# Patient Record
Sex: FEMALE | Race: OTHER | Employment: FULL TIME | URBAN - METROPOLITAN AREA
[De-identification: names, ages, dates, MRNs, and addresses within clinical notes are randomized per-mention and may not be internally consistent; named-entity substitution may affect disease eponyms.]

---

## 2021-05-26 ENCOUNTER — OFFICE VISIT (OUTPATIENT)
Dept: FAMILY MEDICINE CLINIC | Facility: CLINIC | Age: 21
End: 2021-05-26

## 2021-05-26 VITALS
HEIGHT: 62 IN | TEMPERATURE: 97 F | BODY MASS INDEX: 17.7 KG/M2 | HEART RATE: 92 BPM | WEIGHT: 96.19 LBS | DIASTOLIC BLOOD PRESSURE: 76 MMHG | SYSTOLIC BLOOD PRESSURE: 114 MMHG

## 2021-05-26 DIAGNOSIS — Z00.00 ROUTINE MEDICAL EXAM: Primary | ICD-10-CM

## 2021-05-26 DIAGNOSIS — R63.6 LOW WEIGHT: ICD-10-CM

## 2021-05-26 DIAGNOSIS — N92.6 IRREGULAR MENSES: ICD-10-CM

## 2021-05-26 PROCEDURE — 99385 PREV VISIT NEW AGE 18-39: CPT | Performed by: FAMILY MEDICINE

## 2021-05-26 PROCEDURE — 3078F DIAST BP <80 MM HG: CPT | Performed by: FAMILY MEDICINE

## 2021-05-26 PROCEDURE — 3074F SYST BP LT 130 MM HG: CPT | Performed by: FAMILY MEDICINE

## 2021-05-26 PROCEDURE — 3008F BODY MASS INDEX DOCD: CPT | Performed by: FAMILY MEDICINE

## 2021-05-26 RX ORDER — NORETHINDRONE ACETATE AND ETHINYL ESTRADIOL 1MG-20(21)
1 KIT ORAL DAILY
Qty: 84 TABLET | Refills: 1 | Status: SHIPPED | OUTPATIENT
Start: 2021-05-26 | End: 2021-11-23

## 2021-05-26 NOTE — PROGRESS NOTES
HPI:   Janae Grissom is a 23year old female who presents for a complete physical exam.    Reports her menses is irregular - will skip it and then when comes it is heavy and some months few days. Reports irregular for a while.  Was on OCPs about 5 years ago bruits  CHEST: no chest tenderness  LUNGS: clear to auscultation  CARDIO: RRR without murmur  GI: good BS's,no masses, HSM or tenderness  MUSCULOSKELETAL: back is not tender,FROM of the back  EXTREMITIES: no cyanosis, clubbing or edema  NEURO: Oriented jessu

## 2021-05-29 ENCOUNTER — LAB ENCOUNTER (OUTPATIENT)
Dept: LAB | Age: 21
End: 2021-05-29
Attending: FAMILY MEDICINE
Payer: COMMERCIAL

## 2021-05-29 DIAGNOSIS — Z00.00 ROUTINE MEDICAL EXAM: ICD-10-CM

## 2021-05-29 DIAGNOSIS — N92.6 IRREGULAR MENSES: ICD-10-CM

## 2021-05-29 PROCEDURE — 80053 COMPREHEN METABOLIC PANEL: CPT

## 2021-05-29 PROCEDURE — 84703 CHORIONIC GONADOTROPIN ASSAY: CPT

## 2021-05-29 PROCEDURE — 84466 ASSAY OF TRANSFERRIN: CPT

## 2021-05-29 PROCEDURE — 84443 ASSAY THYROID STIM HORMONE: CPT

## 2021-05-29 PROCEDURE — 83540 ASSAY OF IRON: CPT

## 2021-05-29 PROCEDURE — 36415 COLL VENOUS BLD VENIPUNCTURE: CPT

## 2021-05-29 PROCEDURE — 84445 ASSAY OF TSI GLOBULIN: CPT

## 2021-05-29 PROCEDURE — 86800 THYROGLOBULIN ANTIBODY: CPT

## 2021-05-29 PROCEDURE — 80061 LIPID PANEL: CPT

## 2021-05-29 PROCEDURE — 84439 ASSAY OF FREE THYROXINE: CPT

## 2021-05-29 PROCEDURE — 85025 COMPLETE CBC W/AUTO DIFF WBC: CPT

## 2021-05-29 PROCEDURE — 82306 VITAMIN D 25 HYDROXY: CPT

## 2021-05-29 PROCEDURE — 82607 VITAMIN B-12: CPT

## 2021-06-05 NOTE — PROGRESS NOTES
Bee Gabriel - The pregnancy test was negative so ok to start the birth control. Your thyroid levels were all normal. Your vitamin D levels were extremely low so I am starting you on weekly high dose vitamin D 50,000.  Also your iron levels were low so will sta

## 2021-09-21 ENCOUNTER — OFFICE VISIT (OUTPATIENT)
Dept: FAMILY MEDICINE CLINIC | Facility: CLINIC | Age: 21
End: 2021-09-21

## 2021-09-21 VITALS
TEMPERATURE: 97 F | WEIGHT: 105.63 LBS | SYSTOLIC BLOOD PRESSURE: 122 MMHG | HEART RATE: 109 BPM | HEIGHT: 62 IN | BODY MASS INDEX: 19.44 KG/M2 | DIASTOLIC BLOOD PRESSURE: 77 MMHG

## 2021-09-21 DIAGNOSIS — G44.209 TENSION HEADACHE: Primary | ICD-10-CM

## 2021-09-21 PROCEDURE — 3074F SYST BP LT 130 MM HG: CPT | Performed by: FAMILY MEDICINE

## 2021-09-21 PROCEDURE — 99213 OFFICE O/P EST LOW 20 MIN: CPT | Performed by: FAMILY MEDICINE

## 2021-09-21 PROCEDURE — 3078F DIAST BP <80 MM HG: CPT | Performed by: FAMILY MEDICINE

## 2021-09-21 PROCEDURE — 3008F BODY MASS INDEX DOCD: CPT | Performed by: FAMILY MEDICINE

## 2021-09-21 RX ORDER — NAPROXEN 500 MG/1
500 TABLET ORAL 2 TIMES DAILY WITH MEALS
Qty: 20 TABLET | Refills: 0 | Status: SHIPPED | OUTPATIENT
Start: 2021-09-21 | End: 2021-12-17

## 2021-09-21 RX ORDER — CYCLOBENZAPRINE HCL 5 MG
5 TABLET ORAL NIGHTLY
Qty: 20 TABLET | Refills: 0 | Status: SHIPPED | OUTPATIENT
Start: 2021-09-21 | End: 2021-12-17

## 2021-09-21 RX ORDER — ERGOCALCIFEROL 1.25 MG/1
50000 CAPSULE ORAL WEEKLY
COMMUNITY
Start: 2021-08-15

## 2021-09-21 NOTE — PROGRESS NOTES
Valarie Cohw is a 19year old female. Patient presents with:  Headache: x 1 week    HPI:   Reports over a week with bad headaches. Reports starts on temples and feels around to back of head. Reports a lot of stress and anxiety and making them worse.  No na clubbing or edema  NEURO: normal gait. Neg romberg. Normal strength. ASSESSMENT AND PLAN:   1. Tension headache  Trial of flexeril in evenings and naproxen BID. Cut back on caffeine.        The patient indicates understanding of these issues and agree

## 2021-11-24 RX ORDER — NORETHINDRONE ACETATE AND ETHINYL ESTRADIOL 1MG-20(21)
1 KIT ORAL DAILY
Qty: 28 TABLET | Refills: 0 | Status: SHIPPED | OUTPATIENT
Start: 2021-11-24 | End: 2021-12-17

## 2021-11-24 RX ORDER — NORETHINDRONE ACETATE AND ETHINYL ESTRADIOL 1MG-20(21)
1 KIT ORAL DAILY
Qty: 84 TABLET | Refills: 1 | OUTPATIENT
Start: 2021-11-24

## 2021-12-17 ENCOUNTER — OFFICE VISIT (OUTPATIENT)
Dept: FAMILY MEDICINE CLINIC | Facility: CLINIC | Age: 21
End: 2021-12-17

## 2021-12-17 VITALS
BODY MASS INDEX: 19.32 KG/M2 | WEIGHT: 105 LBS | DIASTOLIC BLOOD PRESSURE: 73 MMHG | HEIGHT: 62 IN | HEART RATE: 128 BPM | SYSTOLIC BLOOD PRESSURE: 115 MMHG

## 2021-12-17 DIAGNOSIS — Z00.00 WELL WOMAN EXAM (NO GYNECOLOGICAL EXAM): Primary | ICD-10-CM

## 2021-12-17 DIAGNOSIS — Z12.4 SCREENING FOR MALIGNANT NEOPLASM OF CERVIX: ICD-10-CM

## 2021-12-17 PROCEDURE — 3078F DIAST BP <80 MM HG: CPT | Performed by: NURSE PRACTITIONER

## 2021-12-17 PROCEDURE — 3008F BODY MASS INDEX DOCD: CPT | Performed by: NURSE PRACTITIONER

## 2021-12-17 PROCEDURE — 3074F SYST BP LT 130 MM HG: CPT | Performed by: NURSE PRACTITIONER

## 2021-12-17 RX ORDER — NORETHINDRONE ACETATE AND ETHINYL ESTRADIOL 1MG-20(21)
1 KIT ORAL DAILY
Qty: 84 TABLET | Refills: 3 | Status: SHIPPED | OUTPATIENT
Start: 2021-12-17

## 2021-12-17 NOTE — PROGRESS NOTES
HPI    Patient presents for pap only; has never had completed prior to today. Last physical 5/26/21. Currently on ocp's for birth control. Needs refill. G0. LMP 11/29/21. Review of Systems   Genitourinary: Negative.           12/17/21  0900 tablet by mouth daily. 84 tablet 3   • ergocalciferol 1.25 MG (82225 UT) Oral Cap Take 50,000 Units by mouth once a week. • amitriptyline 10 MG Oral Tab Take 1 tablet (10 mg total) by mouth nightly.  30 tablet 0       Allergies:  No Known Allergies    P

## 2021-12-17 NOTE — PATIENT INSTRUCTIONS
The Range of Pap Test Results  When your Pap test is sent to the lab, the lab studies your cell samples and reports any abnormal cell changes. Your healthcare provider can discuss these changes with you.  In some cases, an abnormal Pap test is due to an i destroy or remove problem cells. (Reported as low-grade RAVEN or TIFFANY 1.)  · Moderate to severe dysplasia. Cells show precancerous changes. Or noninvasive cancer (carcinoma in situ) may be present. Treatment to destroy or remove problem cells is likely.  (Repo

## 2022-01-14 ENCOUNTER — TELEMEDICINE (OUTPATIENT)
Dept: FAMILY MEDICINE CLINIC | Facility: CLINIC | Age: 22
End: 2022-01-14

## 2022-01-14 ENCOUNTER — NURSE TRIAGE (OUTPATIENT)
Dept: FAMILY MEDICINE CLINIC | Facility: CLINIC | Age: 22
End: 2022-01-14

## 2022-01-14 ENCOUNTER — PATIENT MESSAGE (OUTPATIENT)
Dept: FAMILY MEDICINE CLINIC | Facility: CLINIC | Age: 22
End: 2022-01-14

## 2022-01-14 DIAGNOSIS — J02.9 SORE THROAT: Primary | ICD-10-CM

## 2022-01-14 DIAGNOSIS — J06.9 VIRAL UPPER RESPIRATORY TRACT INFECTION: Primary | ICD-10-CM

## 2022-01-14 PROCEDURE — 99213 OFFICE O/P EST LOW 20 MIN: CPT | Performed by: NURSE PRACTITIONER

## 2022-01-14 NOTE — TELEPHONE ENCOUNTER
Kylie Brian RN 1/14/2022 11:04 AM CST        ----- Message -----  From: Marni Dates  Sent: 1/14/2022 10:50 AM CST  To: Em Rn Triage  Subject: PCR results     Hello, I attached my results from my PCR test and it came back negative is there any availab

## 2022-01-14 NOTE — TELEPHONE ENCOUNTER
Action Requested: Summary for Provider     []  Critical Lab, Recommendations Needed  [] Need Additional Advice  []   FYI    []   Need Orders  [] Need Medications Sent to Pharmacy  []  Other     SUMMARY:video appt made, s/s Wednesday sore throat, h/a, elaina

## 2022-01-14 NOTE — PATIENT INSTRUCTIONS
Viral Upper Respiratory Illness (Adult)    You have a viral upper respiratory illness (URI), which is another term for the common cold. This illness is contagious during the first few days. It is spread through the air by coughing and sneezing.  It may use decongestants if you have high blood pressure.)  Follow-up care  Follow up with your healthcare provider, or as advised.   When to seek medical advice  Call your healthcare provider right away if any of these occur:  · Cough with lots of colored sputum

## 2022-01-14 NOTE — ASSESSMENT & PLAN NOTE
pcr covid test results pending  Presume covid infection-discussed self isolation  Supportive care discussed to help alleviate symptoms  Please call if symptoms worsen or are not resolving.

## 2022-01-14 NOTE — PROGRESS NOTES
HPI  Video visit Pt presents for uri symptoms that started 2 days ago. Chills, sore throat, headache, upper back pain. Fever 102.8. Vomited x1 on Wed. Throat is burning. Has slight cough  Fever and chills have subsided.      Rapid test came back negative- Concerns:        Not on file    Social History Narrative      Not on file    Social Determinants of Health  Financial Resource Strain: Not on file  Food Insecurity: Not on file  Transportation Needs: Not on file  Physical Activity: Not on file  Stress: Not

## 2022-01-15 ENCOUNTER — LAB ENCOUNTER (OUTPATIENT)
Dept: LAB | Age: 22
End: 2022-01-15
Attending: NURSE PRACTITIONER
Payer: COMMERCIAL

## 2022-01-15 ENCOUNTER — IMMUNIZATION (OUTPATIENT)
Dept: LAB | Facility: HOSPITAL | Age: 22
End: 2022-01-15
Attending: EMERGENCY MEDICINE
Payer: COMMERCIAL

## 2022-01-15 DIAGNOSIS — Z23 NEED FOR VACCINATION: Primary | ICD-10-CM

## 2022-01-15 DIAGNOSIS — J02.9 SORE THROAT: ICD-10-CM

## 2022-01-15 PROCEDURE — 0054A SARSCOV2 VAC 30MCG/0.3ML IM: CPT | Performed by: PHYSICIAN ASSISTANT

## 2022-01-15 PROCEDURE — 0004A SARSCOV2 VAC 30MCG/0.3ML IM: CPT | Performed by: PHYSICIAN ASSISTANT

## 2022-01-15 PROCEDURE — 87081 CULTURE SCREEN ONLY: CPT

## 2022-02-25 ENCOUNTER — OFFICE VISIT (OUTPATIENT)
Dept: FAMILY MEDICINE CLINIC | Facility: CLINIC | Age: 22
End: 2022-02-25
Payer: COMMERCIAL

## 2022-02-25 ENCOUNTER — NURSE TRIAGE (OUTPATIENT)
Dept: FAMILY MEDICINE CLINIC | Facility: CLINIC | Age: 22
End: 2022-02-25

## 2022-02-25 VITALS
BODY MASS INDEX: 19.69 KG/M2 | HEART RATE: 103 BPM | WEIGHT: 107 LBS | SYSTOLIC BLOOD PRESSURE: 126 MMHG | HEIGHT: 62 IN | DIASTOLIC BLOOD PRESSURE: 81 MMHG

## 2022-02-25 DIAGNOSIS — J30.9 ALLERGIC CONJUNCTIVITIS OF BOTH EYES AND RHINITIS: Primary | ICD-10-CM

## 2022-02-25 DIAGNOSIS — H10.13 ALLERGIC CONJUNCTIVITIS OF BOTH EYES AND RHINITIS: Primary | ICD-10-CM

## 2022-02-25 PROCEDURE — 3074F SYST BP LT 130 MM HG: CPT | Performed by: NURSE PRACTITIONER

## 2022-02-25 PROCEDURE — 3008F BODY MASS INDEX DOCD: CPT | Performed by: NURSE PRACTITIONER

## 2022-02-25 PROCEDURE — 3079F DIAST BP 80-89 MM HG: CPT | Performed by: NURSE PRACTITIONER

## 2022-02-25 PROCEDURE — 99213 OFFICE O/P EST LOW 20 MIN: CPT | Performed by: NURSE PRACTITIONER

## 2022-02-25 RX ORDER — AZELASTINE HYDROCHLORIDE 0.5 MG/ML
1 SOLUTION/ DROPS OPHTHALMIC 2 TIMES DAILY
Qty: 6 ML | Refills: 3 | Status: SHIPPED | OUTPATIENT
Start: 2022-02-25

## 2022-02-25 NOTE — ASSESSMENT & PLAN NOTE
-otc non-drowsy antihistamine (generic claritin, zyrtec or allegra)  -add steroidal nasal spray (flonase, rhinocort -generic works well)  -allergy eye drops    -supportive care discussed  -Please call if symptoms worsen or are not resolving.

## 2022-08-02 ENCOUNTER — OFFICE VISIT (OUTPATIENT)
Dept: FAMILY MEDICINE CLINIC | Facility: CLINIC | Age: 22
End: 2022-08-02
Payer: COMMERCIAL

## 2022-08-02 VITALS
HEIGHT: 62 IN | BODY MASS INDEX: 19.69 KG/M2 | HEART RATE: 120 BPM | SYSTOLIC BLOOD PRESSURE: 129 MMHG | WEIGHT: 107 LBS | DIASTOLIC BLOOD PRESSURE: 90 MMHG

## 2022-08-02 DIAGNOSIS — Z00.00 WELL ADULT EXAM: Primary | ICD-10-CM

## 2022-08-02 DIAGNOSIS — F41.9 ANXIETY: ICD-10-CM

## 2022-08-02 PROCEDURE — 3074F SYST BP LT 130 MM HG: CPT | Performed by: NURSE PRACTITIONER

## 2022-08-02 PROCEDURE — 3080F DIAST BP >= 90 MM HG: CPT | Performed by: NURSE PRACTITIONER

## 2022-08-02 PROCEDURE — 99213 OFFICE O/P EST LOW 20 MIN: CPT | Performed by: NURSE PRACTITIONER

## 2022-08-02 PROCEDURE — 3008F BODY MASS INDEX DOCD: CPT | Performed by: NURSE PRACTITIONER

## 2022-08-02 PROCEDURE — 99395 PREV VISIT EST AGE 18-39: CPT | Performed by: NURSE PRACTITIONER

## 2022-08-06 ENCOUNTER — LAB ENCOUNTER (OUTPATIENT)
Dept: LAB | Age: 22
End: 2022-08-06
Attending: NURSE PRACTITIONER
Payer: COMMERCIAL

## 2022-08-06 DIAGNOSIS — Z00.00 WELL ADULT EXAM: ICD-10-CM

## 2022-08-06 LAB
ALBUMIN SERPL-MCNC: 3.9 G/DL (ref 3.4–5)
ALBUMIN/GLOB SERPL: 1 {RATIO} (ref 1–2)
ALP LIVER SERPL-CCNC: 71 U/L
ALT SERPL-CCNC: 21 U/L
ANION GAP SERPL CALC-SCNC: 8 MMOL/L (ref 0–18)
AST SERPL-CCNC: 18 U/L (ref 15–37)
BASOPHILS # BLD AUTO: 0.04 X10(3) UL (ref 0–0.2)
BASOPHILS NFR BLD AUTO: 0.8 %
BILIRUB SERPL-MCNC: 0.6 MG/DL (ref 0.1–2)
BUN BLD-MCNC: 10 MG/DL (ref 7–18)
BUN/CREAT SERPL: 13.5 (ref 10–20)
CALCIUM BLD-MCNC: 9.4 MG/DL (ref 8.5–10.1)
CHLORIDE SERPL-SCNC: 106 MMOL/L (ref 98–112)
CHOLEST SERPL-MCNC: 230 MG/DL (ref ?–200)
CO2 SERPL-SCNC: 25 MMOL/L (ref 21–32)
CREAT BLD-MCNC: 0.74 MG/DL
DEPRECATED RDW RBC AUTO: 42.6 FL (ref 35.1–46.3)
EOSINOPHIL # BLD AUTO: 0.05 X10(3) UL (ref 0–0.7)
EOSINOPHIL NFR BLD AUTO: 1 %
ERYTHROCYTE [DISTWIDTH] IN BLOOD BY AUTOMATED COUNT: 12.6 % (ref 11–15)
FASTING PATIENT LIPID ANSWER: YES
FASTING STATUS PATIENT QL REPORTED: YES
GFR SERPLBLD BASED ON 1.73 SQ M-ARVRAT: 116 ML/MIN/1.73M2 (ref 60–?)
GLOBULIN PLAS-MCNC: 4 G/DL (ref 2.8–4.4)
GLUCOSE BLD-MCNC: 83 MG/DL (ref 70–99)
HCT VFR BLD AUTO: 46.2 %
HDLC SERPL-MCNC: 38 MG/DL (ref 40–59)
HGB BLD-MCNC: 14.8 G/DL
IMM GRANULOCYTES # BLD AUTO: 0.03 X10(3) UL (ref 0–1)
IMM GRANULOCYTES NFR BLD: 0.6 %
LDLC SERPL CALC-MCNC: 170 MG/DL (ref ?–100)
LYMPHOCYTES # BLD AUTO: 2.24 X10(3) UL (ref 1–4)
LYMPHOCYTES NFR BLD AUTO: 46.5 %
MCH RBC QN AUTO: 29.5 PG (ref 26–34)
MCHC RBC AUTO-ENTMCNC: 32 G/DL (ref 31–37)
MCV RBC AUTO: 92.2 FL
MONOCYTES # BLD AUTO: 0.3 X10(3) UL (ref 0.1–1)
MONOCYTES NFR BLD AUTO: 6.2 %
NEUTROPHILS # BLD AUTO: 2.16 X10 (3) UL (ref 1.5–7.7)
NEUTROPHILS # BLD AUTO: 2.16 X10(3) UL (ref 1.5–7.7)
NEUTROPHILS NFR BLD AUTO: 44.9 %
NONHDLC SERPL-MCNC: 192 MG/DL (ref ?–130)
OSMOLALITY SERPL CALC.SUM OF ELEC: 286 MOSM/KG (ref 275–295)
PLATELET # BLD AUTO: 473 10(3)UL (ref 150–450)
POTASSIUM SERPL-SCNC: 3.9 MMOL/L (ref 3.5–5.1)
PROT SERPL-MCNC: 7.9 G/DL (ref 6.4–8.2)
RBC # BLD AUTO: 5.01 X10(6)UL
SODIUM SERPL-SCNC: 139 MMOL/L (ref 136–145)
TRIGL SERPL-MCNC: 120 MG/DL (ref 30–149)
TSI SER-ACNC: 1.09 MIU/ML (ref 0.36–3.74)
VLDLC SERPL CALC-MCNC: 24 MG/DL (ref 0–30)
WBC # BLD AUTO: 4.8 X10(3) UL (ref 4–11)

## 2022-08-06 PROCEDURE — 80053 COMPREHEN METABOLIC PANEL: CPT

## 2022-08-06 PROCEDURE — 80061 LIPID PANEL: CPT

## 2022-08-06 PROCEDURE — 85025 COMPLETE CBC W/AUTO DIFF WBC: CPT

## 2022-08-06 PROCEDURE — 84443 ASSAY THYROID STIM HORMONE: CPT

## 2022-08-06 PROCEDURE — 36415 COLL VENOUS BLD VENIPUNCTURE: CPT

## 2023-01-11 ENCOUNTER — TELEPHONE (OUTPATIENT)
Dept: FAMILY MEDICINE CLINIC | Facility: CLINIC | Age: 23
End: 2023-01-11

## 2023-01-12 ENCOUNTER — OFFICE VISIT (OUTPATIENT)
Dept: FAMILY MEDICINE CLINIC | Facility: CLINIC | Age: 23
End: 2023-01-12

## 2023-01-12 VITALS
SYSTOLIC BLOOD PRESSURE: 123 MMHG | WEIGHT: 107.63 LBS | DIASTOLIC BLOOD PRESSURE: 82 MMHG | TEMPERATURE: 98 F | HEIGHT: 62 IN | HEART RATE: 112 BPM | BODY MASS INDEX: 19.81 KG/M2

## 2023-01-12 DIAGNOSIS — R35.0 URINE FREQUENCY: ICD-10-CM

## 2023-01-12 DIAGNOSIS — K21.9 GASTROESOPHAGEAL REFLUX DISEASE, UNSPECIFIED WHETHER ESOPHAGITIS PRESENT: Primary | ICD-10-CM

## 2023-01-12 LAB
APPEARANCE: CLEAR
BILIRUBIN: NEGATIVE
GLUCOSE (URINE DIPSTICK): NEGATIVE MG/DL
KETONES (URINE DIPSTICK): NEGATIVE MG/DL
LEUKOCYTES: NEGATIVE
MULTISTIX LOT#: NORMAL NUMERIC
NITRITE, URINE: NEGATIVE
OCCULT BLOOD: NEGATIVE
PH, URINE: 6 (ref 4.5–8)
PROTEIN (URINE DIPSTICK): NEGATIVE MG/DL
SPECIFIC GRAVITY: 1.02 (ref 1–1.03)
URINE-COLOR: YELLOW
UROBILINOGEN,SEMI-QN: 0.2 MG/DL (ref 0–1.9)

## 2023-01-12 PROCEDURE — 99213 OFFICE O/P EST LOW 20 MIN: CPT | Performed by: FAMILY MEDICINE

## 2023-01-12 PROCEDURE — 3008F BODY MASS INDEX DOCD: CPT | Performed by: FAMILY MEDICINE

## 2023-01-12 PROCEDURE — 3079F DIAST BP 80-89 MM HG: CPT | Performed by: FAMILY MEDICINE

## 2023-01-12 PROCEDURE — 3074F SYST BP LT 130 MM HG: CPT | Performed by: FAMILY MEDICINE

## 2023-01-12 PROCEDURE — 81003 URINALYSIS AUTO W/O SCOPE: CPT | Performed by: FAMILY MEDICINE

## 2023-01-12 RX ORDER — OMEPRAZOLE 20 MG/1
20 CAPSULE, DELAYED RELEASE ORAL
Qty: 60 CAPSULE | Refills: 5 | Status: SHIPPED | OUTPATIENT
Start: 2023-01-12

## 2023-01-23 RX ORDER — NORETHINDRONE ACETATE AND ETHINYL ESTRADIOL 1MG-20(21)
KIT ORAL
Qty: 84 TABLET | Refills: 1 | Status: SHIPPED | OUTPATIENT
Start: 2023-01-23

## 2023-02-28 ENCOUNTER — OFFICE VISIT (OUTPATIENT)
Dept: FAMILY MEDICINE CLINIC | Facility: CLINIC | Age: 23
End: 2023-02-28

## 2023-02-28 ENCOUNTER — LAB ENCOUNTER (OUTPATIENT)
Dept: LAB | Age: 23
End: 2023-02-28
Payer: COMMERCIAL

## 2023-02-28 VITALS
BODY MASS INDEX: 19.88 KG/M2 | DIASTOLIC BLOOD PRESSURE: 62 MMHG | HEART RATE: 130 BPM | RESPIRATION RATE: 16 BRPM | OXYGEN SATURATION: 99 % | HEIGHT: 62 IN | TEMPERATURE: 99 F | WEIGHT: 108 LBS | SYSTOLIC BLOOD PRESSURE: 108 MMHG

## 2023-02-28 DIAGNOSIS — R07.89 CHEST TIGHTNESS: ICD-10-CM

## 2023-02-28 DIAGNOSIS — R00.0 INCREASED PULSE RATE: ICD-10-CM

## 2023-02-28 DIAGNOSIS — Z20.822 SUSPECTED COVID-19 VIRUS INFECTION: Primary | ICD-10-CM

## 2023-02-28 LAB
ALBUMIN SERPL-MCNC: 3.6 G/DL (ref 3.4–5)
ALBUMIN/GLOB SERPL: 0.8 {RATIO} (ref 1–2)
ALP LIVER SERPL-CCNC: 70 U/L
ALT SERPL-CCNC: 14 U/L
ANION GAP SERPL CALC-SCNC: 6 MMOL/L (ref 0–18)
AST SERPL-CCNC: 13 U/L (ref 15–37)
ATRIAL RATE: 106 BPM
BASOPHILS # BLD AUTO: 0.05 X10(3) UL (ref 0–0.2)
BASOPHILS NFR BLD AUTO: 0.5 %
BILIRUB SERPL-MCNC: 0.5 MG/DL (ref 0.1–2)
BUN BLD-MCNC: 6 MG/DL (ref 7–18)
BUN/CREAT SERPL: 7.7 (ref 10–20)
CALCIUM BLD-MCNC: 9 MG/DL (ref 8.5–10.1)
CHLORIDE SERPL-SCNC: 106 MMOL/L (ref 98–112)
CO2 SERPL-SCNC: 26 MMOL/L (ref 21–32)
COVID19 BINAX NOW ANTIGEN: NOT DETECTED
CREAT BLD-MCNC: 0.78 MG/DL
DEPRECATED RDW RBC AUTO: 41.2 FL (ref 35.1–46.3)
EOSINOPHIL # BLD AUTO: 0.01 X10(3) UL (ref 0–0.7)
EOSINOPHIL NFR BLD AUTO: 0.1 %
ERYTHROCYTE [DISTWIDTH] IN BLOOD BY AUTOMATED COUNT: 12.6 % (ref 11–15)
FASTING STATUS PATIENT QL REPORTED: YES
GFR SERPLBLD BASED ON 1.73 SQ M-ARVRAT: 109 ML/MIN/1.73M2 (ref 60–?)
GLOBULIN PLAS-MCNC: 4.3 G/DL (ref 2.8–4.4)
GLUCOSE BLD-MCNC: 93 MG/DL (ref 70–99)
HCT VFR BLD AUTO: 40.9 %
HGB BLD-MCNC: 13.6 G/DL
IMM GRANULOCYTES # BLD AUTO: 0.05 X10(3) UL (ref 0–1)
IMM GRANULOCYTES NFR BLD: 0.5 %
LYMPHOCYTES # BLD AUTO: 1.38 X10(3) UL (ref 1–4)
LYMPHOCYTES NFR BLD AUTO: 12.7 %
MCH RBC QN AUTO: 29.6 PG (ref 26–34)
MCHC RBC AUTO-ENTMCNC: 33.3 G/DL (ref 31–37)
MCV RBC AUTO: 89.1 FL
MONOCYTES # BLD AUTO: 0.66 X10(3) UL (ref 0.1–1)
MONOCYTES NFR BLD AUTO: 6.1 %
NEUTROPHILS # BLD AUTO: 8.71 X10 (3) UL (ref 1.5–7.7)
NEUTROPHILS # BLD AUTO: 8.71 X10(3) UL (ref 1.5–7.7)
NEUTROPHILS NFR BLD AUTO: 80.1 %
OPERATOR ID: NORMAL
OSMOLALITY SERPL CALC.SUM OF ELEC: 283 MOSM/KG (ref 275–295)
P AXIS: 67 DEGREES
P-R INTERVAL: 122 MS
PLATELET # BLD AUTO: 250 10(3)UL (ref 150–450)
POCT LOT NUMBER: NORMAL
POTASSIUM SERPL-SCNC: 3.7 MMOL/L (ref 3.5–5.1)
PROT SERPL-MCNC: 7.9 G/DL (ref 6.4–8.2)
Q-T INTERVAL: 296 MS
QRS DURATION: 84 MS
QTC CALCULATION (BEZET): 393 MS
R AXIS: 74 DEGREES
RBC # BLD AUTO: 4.59 X10(6)UL
SODIUM SERPL-SCNC: 138 MMOL/L (ref 136–145)
T AXIS: 28 DEGREES
T4 FREE SERPL-MCNC: 1.1 NG/DL (ref 0.8–1.7)
TSI SER-ACNC: 1.04 MIU/ML (ref 0.36–3.74)
VENTRICULAR RATE: 106 BPM
WBC # BLD AUTO: 10.9 X10(3) UL (ref 4–11)

## 2023-02-28 PROCEDURE — 3078F DIAST BP <80 MM HG: CPT

## 2023-02-28 PROCEDURE — 93005 ELECTROCARDIOGRAM TRACING: CPT

## 2023-02-28 PROCEDURE — 85025 COMPLETE CBC W/AUTO DIFF WBC: CPT

## 2023-02-28 PROCEDURE — 84443 ASSAY THYROID STIM HORMONE: CPT

## 2023-02-28 PROCEDURE — 3074F SYST BP LT 130 MM HG: CPT

## 2023-02-28 PROCEDURE — 36415 COLL VENOUS BLD VENIPUNCTURE: CPT

## 2023-02-28 PROCEDURE — 3008F BODY MASS INDEX DOCD: CPT

## 2023-02-28 PROCEDURE — 84439 ASSAY OF FREE THYROXINE: CPT

## 2023-02-28 PROCEDURE — 93010 ELECTROCARDIOGRAM REPORT: CPT | Performed by: INTERNAL MEDICINE

## 2023-02-28 PROCEDURE — 80053 COMPREHEN METABOLIC PANEL: CPT

## 2023-02-28 PROCEDURE — 99213 OFFICE O/P EST LOW 20 MIN: CPT

## 2023-03-10 ENCOUNTER — HOSPITAL ENCOUNTER (OUTPATIENT)
Dept: CV DIAGNOSTICS | Facility: HOSPITAL | Age: 23
Discharge: HOME | End: 2023-03-10
Payer: COMMERCIAL

## 2023-03-10 ENCOUNTER — LAB ENCOUNTER (OUTPATIENT)
Dept: LAB | Facility: HOSPITAL | Age: 23
End: 2023-03-10
Payer: COMMERCIAL

## 2023-03-10 DIAGNOSIS — R79.89 ABNORMAL CBC: ICD-10-CM

## 2023-03-10 DIAGNOSIS — R94.31 ABNORMAL EKG: ICD-10-CM

## 2023-03-10 LAB
BASOPHILS # BLD AUTO: 0.06 X10(3) UL (ref 0–0.2)
BASOPHILS NFR BLD AUTO: 0.5 %
DEPRECATED RDW RBC AUTO: 41.3 FL (ref 35.1–46.3)
EOSINOPHIL # BLD AUTO: 0.02 X10(3) UL (ref 0–0.7)
EOSINOPHIL NFR BLD AUTO: 0.2 %
ERYTHROCYTE [DISTWIDTH] IN BLOOD BY AUTOMATED COUNT: 12.6 % (ref 11–15)
HCT VFR BLD AUTO: 42.2 %
HGB BLD-MCNC: 13.9 G/DL
IMM GRANULOCYTES # BLD AUTO: 0.07 X10(3) UL (ref 0–1)
IMM GRANULOCYTES NFR BLD: 0.6 %
LYMPHOCYTES # BLD AUTO: 1.91 X10(3) UL (ref 1–4)
LYMPHOCYTES NFR BLD AUTO: 16.5 %
MCH RBC QN AUTO: 29.6 PG (ref 26–34)
MCHC RBC AUTO-ENTMCNC: 32.9 G/DL (ref 31–37)
MCV RBC AUTO: 90 FL
MONOCYTES # BLD AUTO: 0.36 X10(3) UL (ref 0.1–1)
MONOCYTES NFR BLD AUTO: 3.1 %
NEUTROPHILS # BLD AUTO: 9.13 X10 (3) UL (ref 1.5–7.7)
NEUTROPHILS # BLD AUTO: 9.13 X10(3) UL (ref 1.5–7.7)
NEUTROPHILS NFR BLD AUTO: 79.1 %
PLATELET # BLD AUTO: 441 10(3)UL (ref 150–450)
RBC # BLD AUTO: 4.69 X10(6)UL
WBC # BLD AUTO: 11.6 X10(3) UL (ref 4–11)

## 2023-03-10 PROCEDURE — 36415 COLL VENOUS BLD VENIPUNCTURE: CPT

## 2023-03-10 PROCEDURE — 93350 STRESS TTE ONLY: CPT

## 2023-03-10 PROCEDURE — 93018 CV STRESS TEST I&R ONLY: CPT

## 2023-03-10 PROCEDURE — 85025 COMPLETE CBC W/AUTO DIFF WBC: CPT

## 2023-03-10 PROCEDURE — 93017 CV STRESS TEST TRACING ONLY: CPT

## 2023-03-12 ENCOUNTER — PATIENT MESSAGE (OUTPATIENT)
Dept: FAMILY MEDICINE CLINIC | Facility: CLINIC | Age: 23
End: 2023-03-12

## 2023-03-12 NOTE — TELEPHONE ENCOUNTER
From: Louise Roberto  To: CARTER Morelos  Sent: 3/12/2023 12:04 PM CDT  Subject: CBC W/ DIFFERENTIAL    I have been feeling fine and have not had any more symptoms

## 2023-04-26 ENCOUNTER — OFFICE VISIT (OUTPATIENT)
Dept: FAMILY MEDICINE CLINIC | Facility: CLINIC | Age: 23
End: 2023-04-26

## 2023-04-26 DIAGNOSIS — J02.9 SORE THROAT: Primary | ICD-10-CM

## 2023-04-26 LAB
CONTROL LINE PRESENT WITH A CLEAR BACKGROUND (YES/NO): YES YES/NO
KIT LOT #: NORMAL NUMERIC
STREP GRP A CUL-SCR: POSITIVE

## 2023-04-26 PROCEDURE — 3008F BODY MASS INDEX DOCD: CPT

## 2023-04-26 PROCEDURE — 3074F SYST BP LT 130 MM HG: CPT

## 2023-04-26 PROCEDURE — 87880 STREP A ASSAY W/OPTIC: CPT

## 2023-04-26 PROCEDURE — 99213 OFFICE O/P EST LOW 20 MIN: CPT

## 2023-04-26 PROCEDURE — 3078F DIAST BP <80 MM HG: CPT

## 2023-04-26 RX ORDER — AMOXICILLIN 500 MG/1
CAPSULE ORAL
COMMUNITY
Start: 2023-04-06 | End: 2023-04-26 | Stop reason: ALTCHOICE

## 2023-04-26 RX ORDER — IBUPROFEN 600 MG/1
600 TABLET ORAL EVERY 6 HOURS
COMMUNITY
Start: 2023-04-06

## 2023-04-26 RX ORDER — AMOXICILLIN 500 MG/1
500 CAPSULE ORAL 2 TIMES DAILY
Qty: 20 CAPSULE | Refills: 0 | Status: SHIPPED | OUTPATIENT
Start: 2023-04-26 | End: 2023-05-06

## 2023-04-29 VITALS
RESPIRATION RATE: 18 BRPM | WEIGHT: 106 LBS | OXYGEN SATURATION: 98 % | BODY MASS INDEX: 19.51 KG/M2 | TEMPERATURE: 98 F | DIASTOLIC BLOOD PRESSURE: 68 MMHG | HEIGHT: 62 IN | SYSTOLIC BLOOD PRESSURE: 110 MMHG | HEART RATE: 98 BPM

## 2023-07-31 NOTE — TELEPHONE ENCOUNTER
Last pap test 12-17-21      Please review refill protocol failed/ no protocol  Requested Prescriptions   Pending Prescriptions Disp Refills    BLISOVI FE 1/20 1-20 MG-MCG Oral Tab [Pharmacy Med Name: BLISOVI FE 1/20 TABLETS] 84 tablet 1     Sig: TAKE 1 TABLET BY MOUTH DAILY       Gynecology Medication Protocol Failed - 7/31/2023 11:54 AM        Failed - Pass dependent on manual look-up of last PAP and patient compliance with PAP follow up recommendations        Passed - In person appointment or virtual visit in the past 12 mos or appointment in next 3 mos     Recent Outpatient Visits              3 months ago Sore throat    [facility], [place], Jack Rosser, APRN    Office Visit    5 months ago Suspected COVID-19 virus infection    [facility], [address] [place] [address] Isaiah Jett, APRN    Office Visit    6 months ago Gastroesophageal reflux disease, unspecified whether esophagitis present    [address], [facility], Delmi Hughes MD    Office Visit    12 months ago Well adult exam    [address], [facility], APRN    Office Visit    1 year ago Allergic conjunctivitis of both eyes and rhinitis    [facility], [facility], Mason English, APRN    Office Visit

## 2023-08-01 RX ORDER — NORETHINDRONE ACETATE AND ETHINYL ESTRADIOL 1MG-20(21)
1 KIT ORAL DAILY
Qty: 84 TABLET | Refills: 3 | Status: SHIPPED | OUTPATIENT
Start: 2023-08-01

## 2023-08-14 ENCOUNTER — NURSE TRIAGE (OUTPATIENT)
Dept: FAMILY MEDICINE CLINIC | Facility: CLINIC | Age: 23
End: 2023-08-14

## 2023-08-14 ENCOUNTER — OFFICE VISIT (OUTPATIENT)
Dept: INTERNAL MEDICINE CLINIC | Facility: CLINIC | Age: 23
End: 2023-08-14

## 2023-08-14 VITALS
SYSTOLIC BLOOD PRESSURE: 123 MMHG | HEART RATE: 102 BPM | BODY MASS INDEX: 19.51 KG/M2 | HEIGHT: 62 IN | WEIGHT: 106 LBS | DIASTOLIC BLOOD PRESSURE: 87 MMHG

## 2023-08-14 DIAGNOSIS — H10.9 CONJUNCTIVITIS OF BOTH EYES, UNSPECIFIED CONJUNCTIVITIS TYPE: Primary | ICD-10-CM

## 2023-08-14 PROBLEM — H00.11 CHALAZION OF RIGHT UPPER EYELID: Status: ACTIVE | Noted: 2023-08-14

## 2023-08-14 PROCEDURE — 3008F BODY MASS INDEX DOCD: CPT | Performed by: NURSE PRACTITIONER

## 2023-08-14 PROCEDURE — 3074F SYST BP LT 130 MM HG: CPT | Performed by: NURSE PRACTITIONER

## 2023-08-14 PROCEDURE — 99213 OFFICE O/P EST LOW 20 MIN: CPT | Performed by: NURSE PRACTITIONER

## 2023-08-14 PROCEDURE — 3079F DIAST BP 80-89 MM HG: CPT | Performed by: NURSE PRACTITIONER

## 2023-08-14 RX ORDER — CIPROFLOXACIN HYDROCHLORIDE 3.5 MG/ML
1 SOLUTION/ DROPS TOPICAL
Qty: 5 ML | Refills: 0 | Status: SHIPPED | OUTPATIENT
Start: 2023-08-14 | End: 2023-08-19

## 2023-08-14 NOTE — TELEPHONE ENCOUNTER
Action Requested: Summary for Provider     []  Critical Lab, Recommendations Needed  [] Need Additional Advice  []   FYI    []   Need Orders  [] Need Medications Sent to Pharmacy  []  Other     SUMMARY: OV today with Niki MACHADO    Pt states \"I think I am allergic to new mascara I applied. Both my eyes are swollen and red with crusts since putting it on. Pt denies fever, vision is not impacted. Pt states eyes are itching. No FM providers available and pt agrees to schedule with Niki MACHADO.     OV scheduled 8/14/23 for eval.         Reason for call: Acute  Onset: Data Unavailable

## 2023-10-04 ENCOUNTER — OFFICE VISIT (OUTPATIENT)
Dept: FAMILY MEDICINE CLINIC | Facility: CLINIC | Age: 23
End: 2023-10-04
Payer: COMMERCIAL

## 2023-10-04 VITALS
WEIGHT: 109 LBS | BODY MASS INDEX: 20 KG/M2 | SYSTOLIC BLOOD PRESSURE: 130 MMHG | RESPIRATION RATE: 20 BRPM | HEART RATE: 145 BPM | OXYGEN SATURATION: 97 % | TEMPERATURE: 104 F | DIASTOLIC BLOOD PRESSURE: 86 MMHG

## 2023-10-04 DIAGNOSIS — R50.9 FEVER, UNSPECIFIED: ICD-10-CM

## 2023-10-04 DIAGNOSIS — J03.90 EXUDATIVE TONSILLITIS: Primary | ICD-10-CM

## 2023-10-04 DIAGNOSIS — J02.9 SORE THROAT: ICD-10-CM

## 2023-10-04 LAB
CONTROL LINE PRESENT WITH A CLEAR BACKGROUND (YES/NO): YES YES/NO
KIT LOT #: NORMAL NUMERIC
STREP GRP A CUL-SCR: NEGATIVE

## 2023-10-04 PROCEDURE — 87081 CULTURE SCREEN ONLY: CPT | Performed by: NURSE PRACTITIONER

## 2023-10-04 PROCEDURE — 99203 OFFICE O/P NEW LOW 30 MIN: CPT | Performed by: NURSE PRACTITIONER

## 2023-10-04 PROCEDURE — 87880 STREP A ASSAY W/OPTIC: CPT | Performed by: NURSE PRACTITIONER

## 2023-10-04 PROCEDURE — 3075F SYST BP GE 130 - 139MM HG: CPT | Performed by: NURSE PRACTITIONER

## 2023-10-04 PROCEDURE — 3079F DIAST BP 80-89 MM HG: CPT | Performed by: NURSE PRACTITIONER

## 2023-10-04 PROCEDURE — A9150 MISC/EXPER NON-PRESCRIPT DRU: HCPCS | Performed by: NURSE PRACTITIONER

## 2023-10-04 RX ORDER — CEPHALEXIN 500 MG/1
500 CAPSULE ORAL 2 TIMES DAILY
Qty: 20 CAPSULE | Refills: 0 | Status: SHIPPED | OUTPATIENT
Start: 2023-10-04 | End: 2023-10-14

## 2023-10-04 RX ORDER — ACETAMINOPHEN 500 MG
975 TABLET ORAL ONCE
Status: COMPLETED | OUTPATIENT
Start: 2023-10-04 | End: 2023-10-04

## 2023-10-04 RX ADMIN — ACETAMINOPHEN 1000 MG: 500 MG TABLET ORAL at 14:30:00

## 2023-10-04 NOTE — PATIENT INSTRUCTIONS
1. Take Keflex antibiotic as directed. 2. You are still contagious for 24 hours following the first dose of antibiotics. 3. Perform good hand hygiene often. 4. Change your toothbrush in 2-3 days. 5. Follow up with primary care physician as needed, recommend a follow up within 2 weeks  6. You may use throat lozenges, acetaminophen, or ibuprofen for pain and fever. 7. Gargling with warm salt water may ease your pain for a few hours. You may also drink warmed or salty liquids such as broth, or tea (if of age. No honey under 12 months old).    8. Seek medical attention sooner for worsening of symptoms despite treatment efforts, or the emergency room for the following non inclusive list of symptoms: uncontrolled fever/pain, inability to keep fluids down, shortness of breath or respiratory distress  We will notify you of throat culture results when they are received

## 2024-02-05 ENCOUNTER — OFFICE VISIT (OUTPATIENT)
Dept: FAMILY MEDICINE CLINIC | Facility: CLINIC | Age: 24
End: 2024-02-05
Payer: COMMERCIAL

## 2024-02-05 VITALS
DIASTOLIC BLOOD PRESSURE: 80 MMHG | HEIGHT: 62 IN | HEART RATE: 139 BPM | OXYGEN SATURATION: 100 % | BODY MASS INDEX: 19.36 KG/M2 | SYSTOLIC BLOOD PRESSURE: 112 MMHG | TEMPERATURE: 100 F | WEIGHT: 105.19 LBS

## 2024-02-05 DIAGNOSIS — J02.9 SORE THROAT: Primary | ICD-10-CM

## 2024-02-05 DIAGNOSIS — R00.0 INCREASED PULSE RATE: ICD-10-CM

## 2024-02-05 DIAGNOSIS — K59.09 OTHER CONSTIPATION: ICD-10-CM

## 2024-02-05 DIAGNOSIS — Z20.822 SUSPECTED COVID-19 VIRUS INFECTION: ICD-10-CM

## 2024-02-05 PROCEDURE — 3008F BODY MASS INDEX DOCD: CPT

## 2024-02-05 PROCEDURE — 99213 OFFICE O/P EST LOW 20 MIN: CPT

## 2024-02-05 PROCEDURE — 3074F SYST BP LT 130 MM HG: CPT

## 2024-02-05 PROCEDURE — 3079F DIAST BP 80-89 MM HG: CPT

## 2024-02-05 PROCEDURE — 87880 STREP A ASSAY W/OPTIC: CPT

## 2024-02-05 RX ORDER — AMOXICILLIN AND CLAVULANATE POTASSIUM 875; 125 MG/1; MG/1
1 TABLET, FILM COATED ORAL 2 TIMES DAILY
Qty: 20 TABLET | Refills: 0 | Status: SHIPPED | OUTPATIENT
Start: 2024-02-05 | End: 2024-02-15

## 2024-02-05 NOTE — PATIENT INSTRUCTIONS
Miralax 1 capful daily over the counter, decrease to 3 times weekly if develop loose stools  Align or Culturelle probiotic over the counter daily

## 2024-02-05 NOTE — PROGRESS NOTES
HPI:    Patient ID: Viridiana Olmstead is a 23 year old female.    HPI     Patient here in office with complaint of sore throat, fever, chills, and headache, started on Friday.  Denies cough, shortness of breath, or difficulty breathing.  Took COVID-19 test on Friday, result negative.  Taking ibuprofen as needed with mild improvement.    Also complains of constipation, present for several years.  States she has bowel movement once- twice weekly.  Denies abdominal pain/cramping, nausea, or vomiting.      Review of Systems   Constitutional:  Positive for chills and fever.   HENT:  Positive for sore throat and trouble swallowing.    Respiratory: Negative.  Negative for cough and shortness of breath.    Cardiovascular: Negative.  Negative for chest pain and palpitations.   Gastrointestinal:  Positive for constipation. Negative for abdominal pain, nausea and vomiting.   Musculoskeletal: Negative.    Skin: Negative.    Neurological: Negative.    Psychiatric/Behavioral: Negative.              Current Outpatient Medications   Medication Sig Dispense Refill    amoxicillin clavulanate 875-125 MG Oral Tab Take 1 tablet by mouth 2 (two) times daily for 10 days. 20 tablet 0    Norethin Ace-Eth Estrad-FE (BLISOVI FE 1/20) 1-20 MG-MCG Oral Tab Take 1 tablet by mouth daily. 84 tablet 3     Allergies:No Known Allergies   /80 (BP Location: Right arm, Patient Position: Sitting, Cuff Size: adult)   Pulse (!) 139   Temp 100.1 °F (37.8 °C)   Ht 5' 2\" (1.575 m)   Wt 105 lb 3.2 oz (47.7 kg)   LMP 01/23/2024 (Exact Date)   SpO2 100%   BMI 19.24 kg/m²   Body mass index is 19.24 kg/m².  PHYSICAL EXAM:   Physical Exam  Vitals reviewed.   Constitutional:       General: She is not in acute distress.     Appearance: Normal appearance. She is not ill-appearing.   HENT:      Right Ear: Tympanic membrane, ear canal and external ear normal. There is no impacted cerumen.      Left Ear: Tympanic membrane, ear canal and external ear normal.  There is no impacted cerumen.      Nose: Nose normal. No congestion.      Mouth/Throat:      Mouth: Mucous membranes are moist.      Pharynx: Oropharyngeal exudate and posterior oropharyngeal erythema present.      Comments: Tonsillar swelling (grade 2+ with exudate)   Eyes:      General:         Right eye: No discharge.         Left eye: No discharge.      Conjunctiva/sclera: Conjunctivae normal.   Cardiovascular:      Rate and Rhythm: Normal rate and regular rhythm.      Heart sounds: Normal heart sounds. No murmur heard.     No friction rub. No gallop.   Pulmonary:      Effort: Pulmonary effort is normal. No respiratory distress.      Breath sounds: Normal breath sounds. No stridor. No wheezing, rhonchi or rales.   Chest:      Chest wall: No tenderness.   Musculoskeletal:      Cervical back: Neck supple.   Lymphadenopathy:      Cervical: No cervical adenopathy.   Skin:     General: Skin is warm.      Findings: No rash.   Neurological:      General: No focal deficit present.      Mental Status: She is alert and oriented to person, place, and time.   Psychiatric:         Mood and Affect: Mood normal.         Behavior: Behavior normal.         Thought Content: Thought content normal.         Judgment: Judgment normal.                ASSESSMENT/PLAN:   1. Sore throat  -Rapid strep test completed in office and negative.  - Strep culture sent to lab, will await results  - Started on amoxicillin clavulanate 875-125 MG Oral Tab, Take 1 tablet by mouth 2 (two) times daily for 10 days for suspected tonsillitis versus other.  Take probiotic while taking Augmentin  - Advised patient to call office if symptoms worsening/not improving.  Will place referral for ENT provider  - POC Rapid Strep [14323]  - Grp A Strep Cult, Throat [E]; Future  - Grp A Strep Cult, Throat [E]    2. Suspected COVID-19 virus infection  - SARS-CoV-2/Flu A and B/RSV by PCR (Nisreen) [E] *Collect in Office!; Future    3. Other constipation  -Advised  MiraLAX 1 capful daily, can decrease to 3 times weekly if develops loose stools    4. Increased pulse rate  -Suspect elevated pulse related to fever  - Echo stress test completed in March 2023 for elevated heart rate and was normal       Orders Placed This Encounter   Procedures    POC Rapid Strep [02359]    Grp A Strep Cult, Throat [E]    SARS-CoV-2/Flu A and B/RSV by PCR (Nisreen) [E] *Collect in Office!       Meds This Visit:  Requested Prescriptions     Signed Prescriptions Disp Refills    amoxicillin clavulanate 875-125 MG Oral Tab 20 tablet 0     Sig: Take 1 tablet by mouth 2 (two) times daily for 10 days.       Imaging & Referrals:  None         ID#[id number]

## 2024-02-06 LAB
FLUAV + FLUBV RNA SPEC NAA+PROBE: NOT DETECTED
FLUAV + FLUBV RNA SPEC NAA+PROBE: NOT DETECTED
RSV RNA SPEC NAA+PROBE: NOT DETECTED
SARS-COV-2 RNA RESP QL NAA+PROBE: NOT DETECTED

## 2024-03-07 ENCOUNTER — OFFICE VISIT (OUTPATIENT)
Dept: FAMILY MEDICINE CLINIC | Facility: CLINIC | Age: 24
End: 2024-03-07
Payer: COMMERCIAL

## 2024-03-07 VITALS
TEMPERATURE: 98 F | WEIGHT: 108 LBS | HEART RATE: 106 BPM | SYSTOLIC BLOOD PRESSURE: 117 MMHG | HEIGHT: 62 IN | RESPIRATION RATE: 16 BRPM | DIASTOLIC BLOOD PRESSURE: 88 MMHG | BODY MASS INDEX: 19.88 KG/M2 | OXYGEN SATURATION: 98 %

## 2024-03-07 DIAGNOSIS — J02.9 SORE THROAT: ICD-10-CM

## 2024-03-07 DIAGNOSIS — J06.9 VIRAL URI: Primary | ICD-10-CM

## 2024-03-07 PROCEDURE — 3074F SYST BP LT 130 MM HG: CPT | Performed by: NURSE PRACTITIONER

## 2024-03-07 PROCEDURE — 99213 OFFICE O/P EST LOW 20 MIN: CPT | Performed by: NURSE PRACTITIONER

## 2024-03-07 PROCEDURE — 87637 SARSCOV2&INF A&B&RSV AMP PRB: CPT | Performed by: NURSE PRACTITIONER

## 2024-03-07 PROCEDURE — 3008F BODY MASS INDEX DOCD: CPT | Performed by: NURSE PRACTITIONER

## 2024-03-07 PROCEDURE — 87880 STREP A ASSAY W/OPTIC: CPT | Performed by: NURSE PRACTITIONER

## 2024-03-07 PROCEDURE — 3079F DIAST BP 80-89 MM HG: CPT | Performed by: NURSE PRACTITIONER

## 2024-03-07 NOTE — PROGRESS NOTES
CHIEF COMPLAINT:     Chief Complaint   Patient presents with    Sore Throat     Since Sunday; started out with fever, chills, body aches; pt also has right ear and neck pain          HPI:   Viridiana Olmstead is a 23 year old female presents to clinic with complaint of sore throat. Patient has had for 5 days. Symptoms have been persisting since onset.  Patient reports following associated symptoms: headache, chills, sore throat, and cough.  Yesterday right ear started to hurt and right neck as well. Pt did report initial fever of tmax 101.6-100.3 on day one and two of symptoms only. Treating symptoms with ibuprofen.    No known sick contacts.     Eating and drinking well.     Current Outpatient Medications   Medication Sig Dispense Refill    Norethin Ace-Eth Estrad-FE (BLISOVI FE 1/20) 1-20 MG-MCG Oral Tab Take 1 tablet by mouth daily. 84 tablet 3      History reviewed. No pertinent past medical history.   Social History:  Social History     Socioeconomic History    Marital status: Single   Tobacco Use    Smoking status: Never    Smokeless tobacco: Never   Vaping Use    Vaping Use: Never used   Substance and Sexual Activity    Alcohol use: Yes     Comment: occ    Drug use: Never        REVIEW OF SYSTEMS:   GENERAL HEALTH: good appetite  SKIN: denies any unusual skin lesions or rashes  HEENT: See HPI. No vision changes.   RESPIRATORY: denies shortness of breath or wheezing  CARDIOVASCULAR: denies chest pain or palpitations   GI: denies vomiting or diarrhea or abdominal pain.   NEURO: denies dizziness or lightheadedness    EXAM:   /88   Pulse 106   Temp 97.8 °F (36.6 °C) (Temporal)   Resp 16   Ht 5' 2\" (1.575 m)   Wt 108 lb (49 kg)   LMP 02/09/2024   SpO2 98%   BMI 19.75 kg/m²   GENERAL: well developed, well nourished,in no apparent distress  SKIN: no rashes,no suspicious lesions  HEAD: atraumatic, normocephalic  EYES: conjunctiva clear, EOM intact  EARS: TM's clear, non-injected, no bulging, retraction, or  fluid bilaterally  NOSE: nostrils patent, clear nasal discharge, nasal mucosa pink  THROAT: oral mucosa pink, moist. Posterior pharynx mildly erythematous. no exudates. Tonsils 1/4.  Breath non malodorous. No trismus or hot-potato voice. Uvula midline. PND+  NECK: supple  LUNGS: clear to auscultation bilaterally, no wheezes or rhonchi. Breathing is non labored.  CARDIO: Slightly elevated HR, regular and without murmur  EXTREMITIES: no cyanosis, clubbing or edema  LYMPH: + anterior cervical. no submandibular lymphadenopathy.  No posterior cervical or occipital lymphadenopathy.    Recent Results (from the past 24 hour(s))   Rapid Strep    Collection Time: 03/07/24  5:24 PM   Result Value Ref Range    Strep Grp A Screen Negative Negative    Control Line Present with a clear background (yes/no) Yes Yes/No    Kit Lot # [id number] Numeric    Kit Expiration Date 4/22/25 Date         ASSESSMENT AND PLAN:   Assessment:     1. Viral URI      2. Sore throat    Plan: Discussed that due to symptoms and negative rapid strep this is most likely viral and does not require antibiotics.    Will sent quad resp panel     Comfort measures explained and discussed as listed in Patient Instructions    Discussed s/s of worsening infection/condition with Patient and importance of prompt medical re-evaluation including when to seek emergency care. Patient voiced understanding    Increase fluids and rest. Humidifier in room. Warm steamy showers. Warm salt water gargles TID    May consider OTC tylenol or ibuprofen as needed and directed on packaging for pain/fever    May consider OTC guaifenesin as needed and directed on packaging to thin mucus secretions.    May consider OTC  pseudoephedrine as needed and directed on packaging as a nasal decongestant    May consider OTC Cepacol throat lozenges as needed and directed on packaging for sore throat.     All questions and concerns addressed. Encouraged Patient to call clinic with any questions or  concerns.       Patient Instructions   See attached patient care instructions.      The patient/parent indicates understanding of these issues and agrees to the plan.  The patient is asked to follow up with their PCP as needed.

## 2024-03-21 PROBLEM — J06.9 VIRAL UPPER RESPIRATORY TRACT INFECTION: Status: RESOLVED | Noted: 2022-01-14 | Resolved: 2024-03-21

## 2024-04-02 ENCOUNTER — TELEPHONE (OUTPATIENT)
Dept: FAMILY MEDICINE CLINIC | Facility: CLINIC | Age: 24
End: 2024-04-02

## 2024-04-02 ENCOUNTER — NURSE ONLY (OUTPATIENT)
Dept: LAB | Age: 24
End: 2024-04-02
Attending: NURSE PRACTITIONER
Payer: COMMERCIAL

## 2024-04-02 ENCOUNTER — OFFICE VISIT (OUTPATIENT)
Dept: FAMILY MEDICINE CLINIC | Facility: CLINIC | Age: 24
End: 2024-04-02
Payer: COMMERCIAL

## 2024-04-02 VITALS
BODY MASS INDEX: 20.18 KG/M2 | HEIGHT: 62 IN | TEMPERATURE: 103 F | SYSTOLIC BLOOD PRESSURE: 121 MMHG | HEART RATE: 110 BPM | OXYGEN SATURATION: 98 % | WEIGHT: 109.63 LBS | DIASTOLIC BLOOD PRESSURE: 73 MMHG | RESPIRATION RATE: 18 BRPM

## 2024-04-02 DIAGNOSIS — J11.1 INFLUENZA-LIKE ILLNESS: ICD-10-CM

## 2024-04-02 DIAGNOSIS — J11.1 INFLUENZA-LIKE ILLNESS: Primary | ICD-10-CM

## 2024-04-02 LAB
POCT INFLUENZA A: POSITIVE
POCT INFLUENZA B: NEGATIVE

## 2024-04-02 PROCEDURE — 87502 INFLUENZA DNA AMP PROBE: CPT

## 2024-04-02 PROCEDURE — 3008F BODY MASS INDEX DOCD: CPT | Performed by: NURSE PRACTITIONER

## 2024-04-02 PROCEDURE — 3078F DIAST BP <80 MM HG: CPT | Performed by: NURSE PRACTITIONER

## 2024-04-02 PROCEDURE — 99213 OFFICE O/P EST LOW 20 MIN: CPT | Performed by: NURSE PRACTITIONER

## 2024-04-02 PROCEDURE — 3074F SYST BP LT 130 MM HG: CPT | Performed by: NURSE PRACTITIONER

## 2024-04-02 RX ORDER — OSELTAMIVIR PHOSPHATE 75 MG/1
75 CAPSULE ORAL 2 TIMES DAILY
Qty: 10 CAPSULE | Refills: 0 | Status: SHIPPED | OUTPATIENT
Start: 2024-04-02 | End: 2024-04-07

## 2024-04-02 NOTE — PROGRESS NOTES
CHIEF COMPLAINT:     Chief Complaint   Patient presents with    Cough     Congested,cough, body aches x2days         HPI:   Viridiana Olmstead is a 23 year old female who presents for upper respiratory symptoms for  2 days. Patient reports congestion, fever with Tmax to 101.7, dry cough.  Associated symptoms include headache, sore throat from cough, body aches, nausea, fatigue, abs sore from coughing.  Symptoms have been consistent since onset.  Treating symptoms with ibuprofen at 10pm.       No hx of COVID; No COVID exposure; Neg covid test this am.  No other ill contacts.  No recent travel.  No large gatherings.    Other conditions:   BMI: Body mass index is 20.05 kg/m².      Current Outpatient Medications   Medication Sig Dispense Refill    Norethin Ace-Eth Estrad-FE (BLISOVI FE 1/20) 1-20 MG-MCG Oral Tab Take 1 tablet by mouth daily. 84 tablet 3      No past medical history on file.   Past Surgical History:   Procedure Laterality Date    WISDOM TEETH REMOVED  04/20/2023         Social History     Socioeconomic History    Marital status: Single   Tobacco Use    Smoking status: Never    Smokeless tobacco: Never   Vaping Use    Vaping Use: Never used   Substance and Sexual Activity    Alcohol use: Yes     Comment: occ    Drug use: Never         REVIEW OF SYSTEMS:   GENERAL: feels well otherwise, ok appetite  SKIN: no rashes or abnormal skin lesions  HEENT: See HPI  LUNGS: denies shortness of breath or wheezing, See HPI  CARDIOVASCULAR: denies chest pain or palpitations   GI: denies V/C or abdominal pain  NEURO: denies dizziness or lightheadedness    EXAM:   /73   Pulse 110   Temp (!) 103.2 °F (39.6 °C)   Resp 18   Ht 5' 2\" (1.575 m)   Wt 109 lb 9.6 oz (49.7 kg)   LMP 03/18/2024   SpO2 98%   BMI 20.05 kg/m²     Physical Exam  Constitutional:       General: She is not in acute distress.     Appearance: Normal appearance. She is well-developed.   HENT:      Head: Normocephalic and atraumatic.      Right Ear:  Tympanic membrane and ear canal normal.      Left Ear: Tympanic membrane and ear canal normal.      Nose: Congestion and rhinorrhea present. Rhinorrhea is clear.      Mouth/Throat:      Lips: Pink.      Mouth: Mucous membranes are moist.      Pharynx: Oropharynx is clear. Uvula midline.   Eyes:      Extraocular Movements: Extraocular movements intact.      Conjunctiva/sclera: Conjunctivae normal.   Cardiovascular:      Rate and Rhythm: Normal rate and regular rhythm.      Heart sounds: Normal heart sounds. No murmur heard.  Pulmonary:      Effort: Pulmonary effort is normal.      Breath sounds: Normal breath sounds and air entry.      Comments: Dry cough noted.  Musculoskeletal:      Cervical back: Normal range of motion and neck supple.   Lymphadenopathy:      Cervical: No cervical adenopathy.   Skin:     General: Skin is warm and dry.      Findings: No rash.   Neurological:      General: No focal deficit present.      Mental Status: She is alert.          Recent Results (from the past 24 hour(s))   POCT Flu Test (Drive Thru Order)    Collection Time: 04/02/24  7:05 AM    Specimen: Nares; Other   Result Value Ref Range    POCT INFLUENZA A Positive (A) Negative    POCT INFLUENZA B Negative Negative       ASSESSMENT AND PLAN:   Viridiana Olmstead is a 23 year old female who presents with     ASSESSMENT:   Encounter Diagnosis   Name Primary?    Influenza-like illness Yes       PLAN:   Tylenol 500 mg 2 tabs given in office @ 0720 Lot [id number] Exp 2/2026.  Discussed likely viral etiology. Reviewed symptom relief measures with patient.   Monitor for worsening symptoms.  Comfort care as described in Patient Instructions  Medications as below.      Meds & Refills for this Visit:  Requested Prescriptions     Signed Prescriptions Disp Refills    oseltamivir 75 MG Oral Cap 10 capsule 0     Sig: Take 1 capsule (75 mg total) by mouth 2 (two) times daily for 5 days.       Risks, benefits, and side effects of medication explained and  discussed.  To f/u with PCP if sx do not resolve as anticipated.  The patient indicates understanding of these issues and agrees to the plan.        Patient Instructions   Many symptoms of Influenza/Flu.    Flu is a virus, and not helped by antibiotics  The  antiviral Tamiflu can help shorten symptoms if started within 48 hrs of onset of symptoms.    Discussed pros/cons/side effects of Tamiflu.     Consider OSCILLOCOCCINUM to decrease flu symptoms/duration.  Take it 3 TIMES PER DAY consistently for best results.    Cepacol Throat Losenges for sore throat.  Robitussin (DM) or Mucinex (DM) or Delsym 12H for coughing.  Ibuprofen can help headache and body aches, if not contraindicated.  Get plenty of rest, Drink plenty of fluids. Monitor temperature.   Promoted good hand washing, hand , and Chlorox/Lysol disinfectant use.    Symptoms usually 7-10 days. You will feel very tired for several days. You may not feel back to normal for 1-2 weeks.  Cough into sleeve. Wear mask if coughing around others. Avoid going out in public while sick.  May be contagious for up to a week after symptoms began, but may return to work 24-48 hours after fever completely gone (without Tylenol or Ibuprofen).     Seek medical attention with primary provider or ER if symptoms worsen, especially if shortness of breath or wheezing.

## 2024-04-02 NOTE — TELEPHONE ENCOUNTER
Patient can be seen via video visit.   If she prefers in person visit, that is okay but she will need to wear a mask during the visit.

## 2024-04-02 NOTE — PATIENT INSTRUCTIONS
Many symptoms of Influenza/Flu.    Flu is a virus, and not helped by antibiotics  The  antiviral Tamiflu can help shorten symptoms if started within 48 hrs of onset of symptoms.    Discussed pros/cons/side effects of Tamiflu.     Consider OSCILLOCOCCINUM to decrease flu symptoms/duration.  Take it 3 TIMES PER DAY consistently for best results.    Cepacol Throat Losenges for sore throat.  Robitussin (DM) or Mucinex (DM) or Delsym 12H for coughing.  Ibuprofen can help headache and body aches, if not contraindicated.  Get plenty of rest, Drink plenty of fluids. Monitor temperature.   Promoted good hand washing, hand , and Chlorox/Lysol disinfectant use.    Symptoms usually 7-10 days. You will feel very tired for several days. You may not feel back to normal for 1-2 weeks.  Cough into sleeve. Wear mask if coughing around others. Avoid going out in public while sick.  May be contagious for up to a week after symptoms began, but may return to work 24-48 hours after fever completely gone (without Tylenol or Ibuprofen).     Seek medical attention with primary provider or ER if symptoms worsen, especially if shortness of breath or wheezing.

## 2024-04-02 NOTE — TELEPHONE ENCOUNTER
Sent a my chart message of note below. Jenna, it's for a physical so a virual would not be appropriate. Advised mask or reschedule.

## 2024-04-02 NOTE — TELEPHONE ENCOUNTER
Patient asking if it is ok to keep appointment with CARTER Zhou on April 6, 2024.    Patient started feeling sick with fever 101.7, drycough and sore throat on [place] 3/31/24. She tested positive for influenza A today and is taking Tamiflu.Patient states she has not had a fever today, still  has a cough.Please advise.

## 2024-04-03 NOTE — TELEPHONE ENCOUNTER
[employer] message read by patient.     Last read by Viridiana Olmstead at  6:17 PM on 4/2/2024.

## 2024-04-20 ENCOUNTER — OFFICE VISIT (OUTPATIENT)
Dept: FAMILY MEDICINE CLINIC | Facility: CLINIC | Age: 24
End: 2024-04-20

## 2024-04-20 ENCOUNTER — LAB ENCOUNTER (OUTPATIENT)
Dept: LAB | Age: 24
End: 2024-04-20
Payer: COMMERCIAL

## 2024-04-20 VITALS
SYSTOLIC BLOOD PRESSURE: 111 MMHG | HEIGHT: 62 IN | WEIGHT: 108 LBS | HEART RATE: 98 BPM | DIASTOLIC BLOOD PRESSURE: 73 MMHG | OXYGEN SATURATION: 98 % | BODY MASS INDEX: 19.88 KG/M2 | TEMPERATURE: 98 F | RESPIRATION RATE: 18 BRPM

## 2024-04-20 DIAGNOSIS — R53.83 OTHER FATIGUE: ICD-10-CM

## 2024-04-20 DIAGNOSIS — K13.79 MOUTH SORES: ICD-10-CM

## 2024-04-20 DIAGNOSIS — Z86.2 HISTORY OF ANEMIA: ICD-10-CM

## 2024-04-20 DIAGNOSIS — Z00.00 ROUTINE PHYSICAL EXAMINATION: ICD-10-CM

## 2024-04-20 DIAGNOSIS — Z00.00 ROUTINE PHYSICAL EXAMINATION: Primary | ICD-10-CM

## 2024-04-20 DIAGNOSIS — R35.0 URINARY FREQUENCY: ICD-10-CM

## 2024-04-20 LAB
BASOPHILS # BLD AUTO: 0.06 X10(3) UL (ref 0–0.2)
BASOPHILS NFR BLD AUTO: 1.3 %
BILIRUB UR QL: NEGATIVE
CLARITY UR: CLEAR
DEPRECATED HBV CORE AB SER IA-ACNC: 8.2 NG/ML
DEPRECATED RDW RBC AUTO: 41.9 FL (ref 35.1–46.3)
EOSINOPHIL # BLD AUTO: 0.03 X10(3) UL (ref 0–0.7)
EOSINOPHIL NFR BLD AUTO: 0.6 %
ERYTHROCYTE [DISTWIDTH] IN BLOOD BY AUTOMATED COUNT: 13.2 % (ref 11–15)
EST. AVERAGE GLUCOSE BLD GHB EST-MCNC: 105 MG/DL (ref 68–126)
GLUCOSE UR-MCNC: NORMAL MG/DL
HBA1C MFR BLD: 5.3 % (ref ?–5.7)
HCT VFR BLD AUTO: 42 %
HGB BLD-MCNC: 14.4 G/DL
HYALINE CASTS #/AREA URNS AUTO: PRESENT /LPF
IMM GRANULOCYTES # BLD AUTO: 0.03 X10(3) UL (ref 0–1)
IMM GRANULOCYTES NFR BLD: 0.6 %
IRON SATN MFR SERPL: 14 %
IRON SERPL-MCNC: 78 UG/DL
KETONES UR-MCNC: NEGATIVE MG/DL
LEUKOCYTE ESTERASE UR QL STRIP.AUTO: NEGATIVE
LYMPHOCYTES # BLD AUTO: 1.84 X10(3) UL (ref 1–4)
LYMPHOCYTES NFR BLD AUTO: 39.7 %
MCH RBC QN AUTO: 29.9 PG (ref 26–34)
MCHC RBC AUTO-ENTMCNC: 34.3 G/DL (ref 31–37)
MCV RBC AUTO: 87.1 FL
MONOCYTES # BLD AUTO: 0.34 X10(3) UL (ref 0.1–1)
MONOCYTES NFR BLD AUTO: 7.3 %
NEUTROPHILS # BLD AUTO: 2.34 X10 (3) UL (ref 1.5–7.7)
NEUTROPHILS # BLD AUTO: 2.34 X10(3) UL (ref 1.5–7.7)
NEUTROPHILS NFR BLD AUTO: 50.5 %
NITRITE UR QL STRIP.AUTO: NEGATIVE
PH UR: 5.5 [PH] (ref 5–8)
PLATELET # BLD AUTO: 397 10(3)UL (ref 150–450)
PROT UR-MCNC: NEGATIVE MG/DL
RBC # BLD AUTO: 4.82 X10(6)UL
SP GR UR STRIP: 1.02 (ref 1–1.03)
TIBC SERPL-MCNC: 563 UG/DL (ref 250–425)
TRANSFERRIN SERPL-MCNC: 378 MG/DL (ref 250–380)
TSI SER-ACNC: 1.11 MIU/ML (ref 0.55–4.78)
UROBILINOGEN UR STRIP-ACNC: NORMAL
VIT B12 SERPL-MCNC: 780 PG/ML (ref 211–911)
WBC # BLD AUTO: 4.6 X10(3) UL (ref 4–11)

## 2024-04-20 PROCEDURE — 96127 BRIEF EMOTIONAL/BEHAV ASSMT: CPT

## 2024-04-20 PROCEDURE — 36415 COLL VENOUS BLD VENIPUNCTURE: CPT

## 2024-04-20 PROCEDURE — 82607 VITAMIN B-12: CPT

## 2024-04-20 PROCEDURE — 82728 ASSAY OF FERRITIN: CPT

## 2024-04-20 PROCEDURE — 3074F SYST BP LT 130 MM HG: CPT

## 2024-04-20 PROCEDURE — 3078F DIAST BP <80 MM HG: CPT

## 2024-04-20 PROCEDURE — 3008F BODY MASS INDEX DOCD: CPT

## 2024-04-20 PROCEDURE — 81001 URINALYSIS AUTO W/SCOPE: CPT

## 2024-04-20 PROCEDURE — 84443 ASSAY THYROID STIM HORMONE: CPT

## 2024-04-20 PROCEDURE — 99395 PREV VISIT EST AGE 18-39: CPT

## 2024-04-20 PROCEDURE — 83540 ASSAY OF IRON: CPT

## 2024-04-20 PROCEDURE — 84466 ASSAY OF TRANSFERRIN: CPT

## 2024-04-20 PROCEDURE — 85025 COMPLETE CBC W/AUTO DIFF WBC: CPT

## 2024-04-20 PROCEDURE — 83036 HEMOGLOBIN GLYCOSYLATED A1C: CPT

## 2024-04-20 RX ORDER — NORETHINDRONE ACETATE AND ETHINYL ESTRADIOL 1MG-20(21)
1 KIT ORAL DAILY
Qty: 84 TABLET | Refills: 3 | Status: SHIPPED | OUTPATIENT
Start: 2024-04-20

## 2024-04-20 NOTE — PROGRESS NOTES
HPI:    Patient ID: Viridiana Olmstead is a 23 year old female.    HPI  Chief Complaint   Patient presents with    Routine Physical     Annual physical  Patient reports Hx of anemia  Last Pap smear was: 12/17/21 wnl     Patient here in office for physical.  Last Pap completed in 2021 and was normal.  Reports history of irregular menstrual cycles.  Takes oral contraceptives.      Also complains of headaches twice monthly with light sensitivity.  Denies nausea/vomiting when headaches present.  Takes ibuprofen which provides moderate relief.    Concerned about increased fatigue.  Reports history of anemia and interested in getting blood work checked.    Reports frequent sores in mouth that are painful.  Denies dental caries.  Last seen dentist over 1 year ago.    States she also has urinary frequency.  Denies urinary urgency, burning, or hematuria.      Review of Systems   Constitutional: Negative.  Negative for fever.   HENT:  Positive for mouth sores. Negative for ear pain and sore throat.    Eyes: Negative.  Negative for visual disturbance.   Respiratory: Negative.  Negative for cough and shortness of breath.    Cardiovascular: Negative.  Negative for chest pain and palpitations.   Gastrointestinal: Negative.  Negative for abdominal pain, blood in stool, constipation and diarrhea.   Genitourinary:  Positive for frequency and menstrual problem. Negative for dysuria, hematuria and urgency.   Musculoskeletal: Negative.  Negative for arthralgias and myalgias.   Skin: Negative.  Negative for rash.   Neurological:  Positive for headaches. Negative for dizziness and facial asymmetry.   Psychiatric/Behavioral: Negative.         /73 (BP Location: Left arm, Patient Position: Sitting, Cuff Size: adult)   Pulse 98   Temp 98.3 °F (36.8 °C) (Oral)   Resp 18   Ht 5' 2\" (1.575 m)   Wt 108 lb (49 kg)   LMP 04/17/2024   SpO2 98%   BMI 19.75 kg/m²     History reviewed. No pertinent past medical history.  Past Surgical  History:   Procedure Laterality Date    [place] teeth removed  04/20/2023     Social History     Socioeconomic History    Marital status: Single     Spouse name: Not on file    Number of children: Not on file    Years of education: Not on file    Highest education level: Not on file   Occupational History    Not on file   Tobacco Use    Smoking status: Never    Smokeless tobacco: Never   Vaping Use    Vaping status: Never Used   Substance and Sexual Activity    Alcohol use: Yes     Comment: occ    Drug use: Never    Sexual activity: Not on file   Other Topics Concern    Not on file   Social History Narrative    Not on file     Social Determinants of Health     Financial Resource Strain: Not on file   Food Insecurity: Not on file   Transportation Needs: Not on file   Physical Activity: Not on file   Stress: Not on file   Social Connections: Not on file   Housing Stability: Not on file     History reviewed. No pertinent family history.    Immunization History   Administered Date(s) Administered    Covid-19 Vaccine Pfizer 30 mcg/0.3 ml 03/23/2021, 04/13/2021    Covid-19 Vaccine Pfizer Bivalent 30mcg/0.3mL 01/15/2023    Covid-19 Vaccine Pfizer Maged-Sucrose 30 mcg/0.3 ml 01/15/2022    DTAP 09/08/1998, 11/03/1998, 01/20/1999, 12/04/1999, 08/07/2002    DTAP INFANRIX 09/08/1998, 11/03/1998, 11/08/1998, 01/20/1999, 12/04/1999, 08/07/2002, 09/09/2004    DTP/HIB Combined 03/11/1999    FLU VAC QIV SPLIT 3 YRS AND OLDER (57068) 11/09/2015    HEP A 10/23/2010, 10/23/2010, 09/27/2011, 09/27/2011    HEP A,Ped/Adol,(2 Dose) 10/23/2010, 09/27/2011    HEP B, Ped/Adol 07/01/1998, 08/05/1998, 01/20/1999, 03/11/1999    HIB 09/08/1998, 11/03/1998, 01/20/1999, 07/08/1999    HPV (Gardasil) 10/21/2010, 12/22/2010, 05/09/2011, 10/20/2014    Hib, Unspecified Formulation 09/08/1998, 11/03/1998, 01/20/1999, 07/08/1999    Hpv Virus Vaccine 9 [facility] Im 10/21/2010, 12/22/2010, 05/09/2011    IPV 09/08/1998, 11/03/1998, 01/20/1999, 08/07/2002    MMR  07/08/1999, 08/07/2002    Meningococcal-Menactra 10/21/2010, 04/02/2015    OPV 03/11/1999    TDAP 10/21/2010    Td, Preserv Free 10/21/2010    Varicella 09/27/1999, 10/21/2010       Health Maintenance   Topic Date Due    DTaP,Tdap,and Td Vaccines (8 - Td or Tdap) 10/21/2020    Annual Physical  08/02/2023    COVID-19 Vaccine (5 - 2023-24 season) 09/01/2023    Influenza Vaccine (Season Ended) 10/01/2024    Pap Smear  12/17/2024    Annual Depression Screening  Completed    HPV Vaccines  Completed    Pneumococcal Vaccine: Birth to 64yrs  Aged Out          Current Outpatient Medications   Medication Sig Dispense Refill    Norethin Ace-Eth Estrad-FE (BLISOVI FE 1/20) 1-20 MG-MCG Oral Tab Take 1 tablet by mouth daily. 84 tablet 3     Allergies:No Known Allergies   PHYSICAL EXAM:     Chief Complaint   Patient presents with    Routine Physical     Annual physical  Patient reports Hx of anemia  Last Pap smear was: 12/17/21 wnl      Physical Exam  Vitals reviewed.   Constitutional:       General: She is not in acute distress.     Appearance: Normal appearance. She is well-developed. She is not ill-appearing.   HENT:      Head: Normocephalic and atraumatic.      Right Ear: Tympanic membrane, ear canal and external ear normal. There is no impacted cerumen.      Left Ear: Tympanic membrane, ear canal and external ear normal. There is no impacted cerumen.      Nose: Nose normal. No congestion.      Mouth/Throat:      Mouth: Mucous membranes are moist.      Pharynx: Oropharynx is clear. No oropharyngeal exudate or posterior oropharyngeal erythema.   Eyes:      General:         Right eye: No discharge.         Left eye: No discharge.      Extraocular Movements: Extraocular movements intact.      Conjunctiva/sclera: Conjunctivae normal.      Pupils: Pupils are equal, round, and reactive to light.   Cardiovascular:      Rate and Rhythm: Normal rate and regular rhythm.      Heart sounds: Normal heart sounds. No murmur heard.     No  friction rub. No gallop.   Pulmonary:      Effort: Pulmonary effort is normal. No respiratory distress.      Breath sounds: Normal breath sounds. No stridor. No wheezing, rhonchi or rales.   Chest:      Chest wall: No tenderness.   Abdominal:      General: Bowel sounds are normal. There is no distension.      Palpations: Abdomen is soft. There is no mass.      Tenderness: There is no abdominal tenderness. There is no right CVA tenderness, left CVA tenderness, guarding or rebound.      Hernia: No hernia is present.   Genitourinary:     General: Normal vulva.      Vagina: Normal.   Musculoskeletal:         General: No tenderness. Normal range of motion.      Cervical back: Normal range of motion and neck supple.   Lymphadenopathy:      Cervical: No cervical adenopathy.   Skin:     General: Skin is warm and dry.      Findings: No rash.   Neurological:      General: No focal deficit present.      Mental Status: She is alert and oriented to person, place, and time.      Cranial Nerves: No cranial nerve deficit.      Sensory: No sensory deficit.      Motor: No weakness.      Deep Tendon Reflexes: Reflexes are normal and symmetric.   Psychiatric:         Mood and Affect: Mood normal.         Behavior: Behavior normal.         Thought Content: Thought content normal.         Judgment: Judgment normal.                ASSESSMENT/PLAN:     Return yearly for physicals  Follow up with dentist every 6 months  Follow up with eye doctor yearly  Recommend aerobic exercise for at least 30mins 5 days a week  Yearly flu shot  Tetanus booster every 10 years (Tdap/ Td)  Labs ordered/ or reviewed if done prior to appointment     Encounter Diagnoses   Name Primary?    Routine physical examination Yes    Other fatigue     Mouth sores     Urinary frequency     History of anemia        1. Routine physical examination  -Offered to complete Pap, patient declined.  Will return for Pap  - CBC W Differential W Platelet [E]; Future    2. Other  fatigue  - Ferritin [E]; Future  - Iron And Tibc [E]; Future  - TSH W Reflex To Free T4 [E]; Future    3. Mouth sores  -Advised Sensodyne and alcohol free mouthwash  - Encouraged patient to make follow-up appointment with dentist for exam  - Vitamin B12 [E]; Future    4. Urinary frequency  - Urinalysis with Culture Reflex; Future  - Hemoglobin A1C [E]; Future    5. History of anemia  - Ferritin [E]; Future  - Iron And Tibc [E]; Future  - Vitamin B12 [E]; Future      Orders Placed This Encounter   Procedures    CBC W Differential W Platelet [E]    Ferritin [E]    Iron And Tibc [E]    Urinalysis with Culture Reflex    TSH W Reflex To Free T4 [E]    Hemoglobin A1C [E]    Vitamin B12 [E]       The above note was creating using Dragon speech recognition technology. Please excuse any typos    Meds This Visit:  Requested Prescriptions     Signed Prescriptions Disp Refills    Norethin Ace-Eth Estrad-FE (BLISOVI FE 1/20) 1-20 MG-MCG Oral Tab 84 tablet 3     Sig: Take 1 tablet by mouth daily.       Imaging & Referrals:  None         CARTER Zhou

## 2024-04-27 ENCOUNTER — OFFICE VISIT (OUTPATIENT)
Dept: OTOLARYNGOLOGY | Facility: CLINIC | Age: 24
End: 2024-04-27
Payer: COMMERCIAL

## 2024-04-27 DIAGNOSIS — Z87.898 HISTORY OF ODYNOPHAGIA: ICD-10-CM

## 2024-04-27 DIAGNOSIS — J02.9 SORE THROAT: Primary | ICD-10-CM

## 2024-04-27 PROCEDURE — 99203 OFFICE O/P NEW LOW 30 MIN: CPT | Performed by: SPECIALIST

## 2024-04-27 PROCEDURE — 31575 DIAGNOSTIC LARYNGOSCOPY: CPT | Performed by: SPECIALIST

## 2024-04-28 NOTE — PROGRESS NOTES
Viridiana Olmstead is a 23 year old female.   Chief Complaint   Patient presents with    Throat Problem     Patient reports chronic sore throat. Reports  pain when swallowing      HPI:   Patient with recurring episodes of sore throat and odynophagia about 1-2 times a month.  Associated with significant fevers.  Usually strep negative.    Current Outpatient Medications   Medication Sig Dispense Refill    Ferrous Sulfate 325 (65 Fe) MG Oral Tab Take 1 tablet (325 mg total) by mouth daily with breakfast. 30 tablet 0    Norethin Ace-Eth Estrad-FE (BLISOVI FE 1/20) 1-20 MG-MCG Oral Tab Take 1 tablet by mouth daily. 84 tablet 3      History reviewed. No pertinent past medical history.   Social History:  Social History     Socioeconomic History    Marital status: Single   Tobacco Use    Smoking status: Never    Smokeless tobacco: Never   Vaping Use    Vaping status: Never Used   Substance and Sexual Activity    Alcohol use: Yes     Comment: occ    Drug use: Never        REVIEW OF SYSTEMS:   GENERAL HEALTH: feels well otherwise  GENERAL : denies fever, chills, sweats, weight loss, weight gain  SKIN: denies any unusual skin lesions or rashes  RESPIRATORY: denies shortness of breath with exertion  NEURO: denies headaches    EXAM:   [facility] 04/17/2024   System Details   Skin Inspection - Normal.   Constitutional Overall appearance - Normal.   Head/Face Facial features - Normal. Eyebrows - Normal. Skull - Normal.   Eyes Conjunctiva - Right: Normal, Left: Normal. Pupil - Right: Normal, Left: Normal.    Ears Inspection - Right: Normal, Left: Normal.   Canal - Right: Normal, Left: Normal.    TM - Right: Normal, Left: Normal.   Nasal External nose - Normal.   Nasal septum - Normal.  Turbinates - Normal   Oral/Oropharynx Lips - Normal, Tonsils -plus tonsils no exudate on the date of the visit, Tongue - Normal    Neck Exam Inspection - Normal. Palpation - Normal. Parotid gland - Normal. Thyroid gland - Normal.   Lymph Detail Submental.  Submandibular. Anterior cervical. Posterior cervical. Supraclavicular.  All without enlargement   Psychiatric Orientation - Oriented to time, place, person & situation. Appropriate mood and affect.   Neurological Memory - Normal. Cranial nerves - Cranial nerves II through XII grossly intact.   Nasopharynx Normal by fiberoptic exam   Larynx Consent was obtained for the procedure.  The nose was asesthetized with 1% neosynephrine and 4% lidocaine topical drops.    The scope was placed into the bilateral nares as well as the nasopharynx, hypopharynx and larynx.    All of which were examined.  The  entire larynx including the vallecula, epiglottis, true and false vocal cords, aryepiglottic folds, piriform sinuses and post cricord area were examined for tumors and infectious processes as well as for evidence of reflux and retained secretions.  Vocal cord mobility was also assessed.    Any abnormalities are noted in the exam section.  Normal by fiberoptic exam.  No tumors or lesions seen.  No retained secretions.  Normal vocal cord mobility.     ASSESSMENT AND PLAN:   1. Sore throat  It of examination on the date of the visit although patient not symptomatic.  Would like to examine her during one of the episodes to see whether this might be a sinusitis or an acute tonsillitis.    2. History of odynophagia  Negative laryngeal exam.        The patient indicates understanding of these issues and agrees to the plan.      Geri Lira MD  4/27/2024  7:55 PM

## 2024-04-28 NOTE — PATIENT INSTRUCTIONS
There was a negative exam on the date of the visit.  Past episodes of fever as well as sore throat were noted.  It would be best to try to evaluate this at the time of the illness.

## 2024-05-10 ENCOUNTER — LAB ENCOUNTER (OUTPATIENT)
Dept: LAB | Age: 24
End: 2024-05-10

## 2024-05-10 DIAGNOSIS — E61.1 IRON DEFICIENCY: ICD-10-CM

## 2024-05-10 LAB
BASOPHILS # BLD AUTO: 0.05 X10(3) UL (ref 0–0.2)
BASOPHILS NFR BLD AUTO: 0.5 %
DEPRECATED HBV CORE AB SER IA-ACNC: 15.8 NG/ML
DEPRECATED RDW RBC AUTO: 45 FL (ref 35.1–46.3)
EOSINOPHIL # BLD AUTO: 0.02 X10(3) UL (ref 0–0.7)
EOSINOPHIL NFR BLD AUTO: 0.2 %
ERYTHROCYTE [DISTWIDTH] IN BLOOD BY AUTOMATED COUNT: 13.7 % (ref 11–15)
HCT VFR BLD AUTO: 40.7 %
HGB BLD-MCNC: 13.8 G/DL
IMM GRANULOCYTES # BLD AUTO: 0.03 X10(3) UL (ref 0–1)
IMM GRANULOCYTES NFR BLD: 0.3 %
LYMPHOCYTES # BLD AUTO: 1.23 X10(3) UL (ref 1–4)
LYMPHOCYTES NFR BLD AUTO: 13.5 %
MCH RBC QN AUTO: 30.4 PG (ref 26–34)
MCHC RBC AUTO-ENTMCNC: 33.9 G/DL (ref 31–37)
MCV RBC AUTO: 89.6 FL
MONOCYTES # BLD AUTO: 0.52 X10(3) UL (ref 0.1–1)
MONOCYTES NFR BLD AUTO: 5.7 %
NEUTROPHILS # BLD AUTO: 7.25 X10 (3) UL (ref 1.5–7.7)
NEUTROPHILS # BLD AUTO: 7.25 X10(3) UL (ref 1.5–7.7)
NEUTROPHILS NFR BLD AUTO: 79.8 %
PLATELET # BLD AUTO: 315 10(3)UL (ref 150–450)
RBC # BLD AUTO: 4.54 X10(6)UL
WBC # BLD AUTO: 9.1 X10(3) UL (ref 4–11)

## 2024-05-10 PROCEDURE — 82728 ASSAY OF FERRITIN: CPT

## 2024-05-10 PROCEDURE — 85025 COMPLETE CBC W/AUTO DIFF WBC: CPT

## 2024-05-10 PROCEDURE — 36415 COLL VENOUS BLD VENIPUNCTURE: CPT

## 2024-07-12 ENCOUNTER — PATIENT MESSAGE (OUTPATIENT)
Dept: OTOLARYNGOLOGY | Facility: CLINIC | Age: 24
End: 2024-07-12

## 2024-07-12 NOTE — TELEPHONE ENCOUNTER
From: Viridiana Olmstead  To: Geri Lira  Sent: 7/12/2024 7:50 AM CDT  Subject: Appointment     Hello,     I was there on April and I was told to make an appointment when my throat was hurting to better examine me and I’ve had a really bad sore throat the past 2 days. Is there an available time I would be able to come in?

## 2024-07-12 NOTE — TELEPHONE ENCOUNTER
Dr. Lira said she could see Viridiana on Monday at her last appt which is 10:20AM  in [place] prior to her leaving for surgery.  Sent a reply to Viridiana's [employer] message to see if she could do that time.

## 2024-07-12 NOTE — TELEPHONE ENCOUNTER
Dr. Lira, when patient saw you in April she was advised to come see you when her throat was hurting her.  She's had a sore throat for 2 days now, your schedule is full the next 2-3 weeks. Please advise.

## 2024-07-15 NOTE — TELEPHONE ENCOUNTER
Patient couldn't do the appt today at 10:20am due to work.  I left her a VM letting her know that in [place] tomorrow there's a 1:40pm appt open, nothing else during the week.  She can call the office to see if that appt is still available or send me a [employer] message with her availability and I'll see what we can do for her.

## 2024-08-13 ENCOUNTER — PATIENT OUTREACH (OUTPATIENT)
Dept: FAMILY MEDICINE CLINIC | Facility: CLINIC | Age: 24
End: 2024-08-13

## 2024-08-17 ENCOUNTER — LAB ENCOUNTER (OUTPATIENT)
Dept: LAB | Facility: HOSPITAL | Age: 24
End: 2024-08-17
Payer: COMMERCIAL

## 2024-08-17 ENCOUNTER — OFFICE VISIT (OUTPATIENT)
Dept: FAMILY MEDICINE CLINIC | Facility: CLINIC | Age: 24
End: 2024-08-17
Payer: COMMERCIAL

## 2024-08-17 VITALS
SYSTOLIC BLOOD PRESSURE: 125 MMHG | WEIGHT: 112.38 LBS | DIASTOLIC BLOOD PRESSURE: 87 MMHG | BODY MASS INDEX: 20.68 KG/M2 | HEART RATE: 111 BPM | HEIGHT: 62 IN

## 2024-08-17 DIAGNOSIS — N92.6 MENSTRUAL ABNORMALITY: ICD-10-CM

## 2024-08-17 DIAGNOSIS — N89.8 VAGINAL DISCHARGE: ICD-10-CM

## 2024-08-17 DIAGNOSIS — G44.219 EPISODIC TENSION-TYPE HEADACHE, NOT INTRACTABLE: ICD-10-CM

## 2024-08-17 DIAGNOSIS — R10.2 PELVIC PAIN: ICD-10-CM

## 2024-08-17 DIAGNOSIS — N92.6 MENSTRUAL ABNORMALITY: Primary | ICD-10-CM

## 2024-08-17 DIAGNOSIS — Z01.419 ENCOUNTER FOR GYNECOLOGICAL EXAMINATION: ICD-10-CM

## 2024-08-17 LAB
BASOPHILS # BLD AUTO: 0.08 X10(3) UL (ref 0–0.2)
BASOPHILS NFR BLD AUTO: 1.3 %
DEPRECATED HBV CORE AB SER IA-ACNC: 12.5 NG/ML
DEPRECATED RDW RBC AUTO: 43.8 FL (ref 35.1–46.3)
EOSINOPHIL # BLD AUTO: 0.03 X10(3) UL (ref 0–0.7)
EOSINOPHIL NFR BLD AUTO: 0.5 %
ERYTHROCYTE [DISTWIDTH] IN BLOOD BY AUTOMATED COUNT: 13 % (ref 11–15)
HCT VFR BLD AUTO: 39.3 %
HGB BLD-MCNC: 13.2 G/DL
IMM GRANULOCYTES # BLD AUTO: 0.02 X10(3) UL (ref 0–1)
IMM GRANULOCYTES NFR BLD: 0.3 %
LYMPHOCYTES # BLD AUTO: 2.34 X10(3) UL (ref 1–4)
LYMPHOCYTES NFR BLD AUTO: 37.4 %
MCH RBC QN AUTO: 30.6 PG (ref 26–34)
MCHC RBC AUTO-ENTMCNC: 33.6 G/DL (ref 31–37)
MCV RBC AUTO: 91.2 FL
MONOCYTES # BLD AUTO: 0.37 X10(3) UL (ref 0.1–1)
MONOCYTES NFR BLD AUTO: 5.9 %
NEUTROPHILS # BLD AUTO: 3.42 X10 (3) UL (ref 1.5–7.7)
NEUTROPHILS # BLD AUTO: 3.42 X10(3) UL (ref 1.5–7.7)
NEUTROPHILS NFR BLD AUTO: 54.6 %
PLATELET # BLD AUTO: 346 10(3)UL (ref 150–450)
RBC # BLD AUTO: 4.31 X10(6)UL
TSI SER-ACNC: 1.33 MIU/ML (ref 0.55–4.78)
WBC # BLD AUTO: 6.3 X10(3) UL (ref 4–11)

## 2024-08-17 PROCEDURE — 84443 ASSAY THYROID STIM HORMONE: CPT

## 2024-08-17 PROCEDURE — 3074F SYST BP LT 130 MM HG: CPT

## 2024-08-17 PROCEDURE — 3008F BODY MASS INDEX DOCD: CPT

## 2024-08-17 PROCEDURE — 3079F DIAST BP 80-89 MM HG: CPT

## 2024-08-17 PROCEDURE — 36415 COLL VENOUS BLD VENIPUNCTURE: CPT

## 2024-08-17 PROCEDURE — 85025 COMPLETE CBC W/AUTO DIFF WBC: CPT

## 2024-08-17 PROCEDURE — 82728 ASSAY OF FERRITIN: CPT

## 2024-08-17 PROCEDURE — 99213 OFFICE O/P EST LOW 20 MIN: CPT

## 2024-08-17 RX ORDER — RIZATRIPTAN BENZOATE 10 MG/1
10 TABLET ORAL AS NEEDED
Qty: 15 TABLET | Refills: 0 | Status: SHIPPED | OUTPATIENT
Start: 2024-08-17

## 2024-08-17 NOTE — PROGRESS NOTES
HPI:    Patient ID: Viridiana Olmstead is a 24 year old female.    HPI     Patient here in office with complaint of since irregular menstrual bleeding (occurring every 2 weeks, lasting for 4-5 days).  Also complains of intermittent pelvic pain.  Denies history of uterine fibroids/ovarian cyst.  Taking oral contraceptives daily.  Interested in discontinuing oral contraception to see if menstrual abnormality improves.    Also complains of intermittent headaches, occurring 3 times a week with light sensitivity.  Denies nausea/vomiting.  Has history of migraines while in high school.  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    Due for Pap.  Last Pap completed in 2021 and was normal.      Review of Systems   Constitutional: Negative.    Eyes:  Positive for photophobia.   Respiratory: Negative.     Cardiovascular: Negative.    Genitourinary:  Positive for menstrual problem.   Musculoskeletal: Negative.    Neurological:  Positive for headaches.   Psychiatric/Behavioral: Negative.              Current Outpatient Medications   Medication Sig Dispense Refill    Multiple Vitamins-Minerals (WOMENS MULTI VITAMIN & MINERAL) Oral Tab Take by mouth.      Rizatriptan Benzoate 10 MG Oral Tab Take 1 tablet (10 mg total) by mouth as needed for Migraine (May repeat in 2 hours if headche not improved x 2. Max dose 30 mg/24 hours). 15 tablet 0    Norethin Ace-Eth Estrad-FE (BLISOVI FE 1/20) 1-20 MG-MCG Oral Tab Take 1 tablet by mouth daily. 84 tablet 3     Allergies:No Known Allergies   /87   Pulse 111   Ht 5' 2\" (1.575 m)   Wt 112 lb 6.4 oz (51 kg)   LMP 08/06/2024 (Exact Date)   BMI 20.56 kg/m²   Body mass index is 20.56 kg/m².  PHYSICAL EXAM:   Physical Exam  Vitals reviewed.   Constitutional:       General: She is not in acute distress.     Appearance: Normal appearance. She is not ill-appearing.   Eyes:      General:         Right eye: No discharge.         Left eye: No discharge.       Extraocular Movements: Extraocular movements intact.      Pupils: Pupils are equal, round, and reactive to light.   Cardiovascular:      Rate and Rhythm: Normal rate and regular rhythm.      Heart sounds: Normal heart sounds. No murmur heard.     No friction rub. No gallop.   Pulmonary:      Effort: Pulmonary effort is normal. No respiratory distress.      Breath sounds: Normal breath sounds. No stridor. No wheezing, rhonchi or rales.   Chest:      Chest wall: No tenderness.   Skin:     General: Skin is warm.      Findings: No rash.   Neurological:      General: No focal deficit present.      Mental Status: She is alert and oriented to person, place, and time.      Cranial Nerves: No cranial nerve deficit.      Sensory: No sensory deficit.      Comments: CN II-CN XII grossly intact.  Normal pronator drift, alternating hand movements   Psychiatric:         Mood and Affect: Mood normal.         Behavior: Behavior normal.         Thought Content: Thought content normal.         Judgment: Judgment normal.                ASSESSMENT/PLAN:   1. Menstrual abnormality  -Patient will stop oral contraceptives to see if abnormality improves  - Imaging/test ordered as listed below, will await results for further recommendations  - US PELVIS (TRANSABDOMINAL AND TRANSVAGINAL) (CPT=76856/73988); Future  - Ferritin [E]; Future  - CBC W Differential W Platelet [E]; Future  - TSH W Reflex To Free T4 [E]; Future    2. Encounter for gynecological examination  - ThinPrep PAP with HPV Reflex Request [E]; Future  - ThinPrep PAP with HPV Reflex Request [E]    3. Vaginal discharge  - Vaginitis Vaginosis PCR Panel; Future  - Vaginitis Vaginosis PCR Panel    4. Pelvic pain  -See above    5. Episodic tension-type headache, not intractable  Rizatriptan Benzoate 10 MG Oral Tab,  Take 1 tablet (10 mg total) by mouth as needed for Migraine (May repeat in 2 hours if headche not improved x 2. Max dose 30 mg/24 hours).       Orders Placed This  Encounter   Procedures    Ferritin [E]    CBC W Differential W Platelet [E]    TSH W Reflex To Free T4 [E]    ThinPrep PAP with HPV Reflex Request [E]    Vaginitis Vaginosis PCR Panel       Meds This Visit:  Requested Prescriptions     Signed Prescriptions Disp Refills    Rizatriptan Benzoate 10 MG Oral Tab 15 tablet 0     Sig: Take 1 tablet (10 mg total) by mouth as needed for Migraine (May repeat in 2 hours if headche not improved x 2. Max dose 30 mg/24 hours).       Imaging & Referrals:  US PELVIS (TRANSABDOMINAL AND TRANSVAGINAL) (CPT=76856/56489)         ID#[id number]

## 2024-08-18 LAB
BV BACTERIA DNA VAG QL NAA+PROBE: NEGATIVE
C GLABRATA DNA VAG QL NAA+PROBE: NEGATIVE
C KRUSEI DNA VAG QL NAA+PROBE: NEGATIVE
CANDIDA DNA VAG QL NAA+PROBE: NEGATIVE
T VAGINALIS DNA VAG QL NAA+PROBE: NEGATIVE

## 2024-08-20 LAB — HPV E6+E7 MRNA CVX QL NAA+PROBE: NEGATIVE

## 2024-10-14 ENCOUNTER — HOSPITAL ENCOUNTER (OUTPATIENT)
Dept: ULTRASOUND IMAGING | Facility: HOSPITAL | Age: 24
Discharge: HOME | End: 2024-10-14
Payer: COMMERCIAL

## 2024-10-14 DIAGNOSIS — N92.6 MENSTRUAL ABNORMALITY: ICD-10-CM

## 2024-10-14 PROCEDURE — 76830 TRANSVAGINAL US NON-OB: CPT

## 2024-10-14 PROCEDURE — 76856 US EXAM PELVIC COMPLETE: CPT

## 2024-11-11 ENCOUNTER — LAB ENCOUNTER (OUTPATIENT)
Dept: LAB | Facility: HOSPITAL | Age: 24
End: 2024-11-11
Attending: NURSE PRACTITIONER
Payer: COMMERCIAL

## 2024-11-11 ENCOUNTER — OFFICE VISIT (OUTPATIENT)
Dept: OBGYN CLINIC | Facility: CLINIC | Age: 24
End: 2024-11-11
Payer: COMMERCIAL

## 2024-11-11 VITALS
SYSTOLIC BLOOD PRESSURE: 111 MMHG | HEART RATE: 105 BPM | WEIGHT: 110.81 LBS | DIASTOLIC BLOOD PRESSURE: 72 MMHG | BODY MASS INDEX: 20 KG/M2

## 2024-11-11 DIAGNOSIS — R35.0 URINARY FREQUENCY: ICD-10-CM

## 2024-11-11 DIAGNOSIS — N92.6 IRREGULAR PERIODS: ICD-10-CM

## 2024-11-11 DIAGNOSIS — N89.8 VAGINAL DISCHARGE: ICD-10-CM

## 2024-11-11 DIAGNOSIS — E61.1 IRON DEFICIENCY: ICD-10-CM

## 2024-11-11 DIAGNOSIS — D25.9 UTERINE LEIOMYOMA, UNSPECIFIED LOCATION: Primary | ICD-10-CM

## 2024-11-11 LAB
B-HCG UR QL: NEGATIVE
BILIRUB UR QL: NEGATIVE
CLARITY UR: CLEAR
DEPRECATED HBV CORE AB SER IA-ACNC: 14 NG/ML
GLUCOSE UR-MCNC: NORMAL MG/DL
HGB UR QL STRIP.AUTO: NEGATIVE
KETONES UR-MCNC: NEGATIVE MG/DL
LEUKOCYTE ESTERASE UR QL STRIP.AUTO: NEGATIVE
NITRITE UR QL STRIP.AUTO: NEGATIVE
PH UR: 6 [PH] (ref 5–8)
PROT UR-MCNC: NEGATIVE MG/DL
SP GR UR STRIP: 1.02 (ref 1–1.03)
UROBILINOGEN UR STRIP-ACNC: NORMAL

## 2024-11-11 PROCEDURE — 81025 URINE PREGNANCY TEST: CPT

## 2024-11-11 PROCEDURE — 81003 URINALYSIS AUTO W/O SCOPE: CPT

## 2024-11-11 PROCEDURE — 3074F SYST BP LT 130 MM HG: CPT | Performed by: NURSE PRACTITIONER

## 2024-11-11 PROCEDURE — 36415 COLL VENOUS BLD VENIPUNCTURE: CPT

## 2024-11-11 PROCEDURE — 82728 ASSAY OF FERRITIN: CPT

## 2024-11-11 PROCEDURE — 3078F DIAST BP <80 MM HG: CPT | Performed by: NURSE PRACTITIONER

## 2024-11-11 PROCEDURE — 99214 OFFICE O/P EST MOD 30 MIN: CPT | Performed by: NURSE PRACTITIONER

## 2024-11-11 PROCEDURE — 87086 URINE CULTURE/COLONY COUNT: CPT

## 2024-11-11 NOTE — PROGRESS NOTES
[facility]   Obstetrics and Gynecology    Viridiana Olmstead is a 24 year old female  Patient's last menstrual period was 10/06/2024 (exact date).   Chief Complaint   Patient presents with    Consult     DISCUSS ultrasound RESULTS DUE TO IRREGULAR PERIODS    .   New patient.  Here to discuss pelvic ultrasound and irregular periods. Ultrasound ordered by PCP in July due to had 2 periods in July while on ocps.  Pelvic ultrasound reviewed below - small subserosal leiomyoma [id number].    History of occasional migraines, no aura - triptan prn    Stopped ocps in 2024. She had started ocps in  due to irregular periods and had issues with losing weight - had a hard time gaining weight. She reports prior to starting ocps  her periods would come regular every month but on occasion would skip 1-2 months.    Ocps did regulate her periods. This year she did notice while on ocps some months she would not get a period, or they would be very light.  She wanted to stop ocps due to fertility concerns. Getting  next year and considering trying for pregnancy after wedding.    First period off ocp was 2 weeks late. Last period 10/6. Normal flow for 5 days.  No pain with periods. Will do urine pregnancy test today.  She is sexually active, using condoms.  She reports pain with intercourse - dry. Sometimes bleeding right after intercourse.    Urinary frequency. No dysuria. Not drinking a lot of caffeine or juice, mostly water.  Hx of constipation - taking collagen and has been helping    No acne or hirsutism    Pap:2024 NILM, negative human papillomavirus  No history of abnormal paps  Contraception: ocps    Narrative   PROCEDURE: US PELVIS TRANSABDOMINAL AND TRANSVAGINAL (CPT=76856/44947)     COMPARISON: None.     INDICATIONS: Irregular periods     TECHNIQUE: Pelvic ultrasound using transabdominal and transvaginal technique.  A transvaginal scan was performed for endometrial and adnexal evaluation      FINDINGS:  UTERUS:   Anteverted uterus measures 6.4 x 3.4 x 3.9 cm.     ENDOMETRIUM: Endometrial thickness is 6.8 mm.  No fluid or mass in the endometrial canal.    MYOMETRIUM: Anterior Sub serosal uterine leiomyoma measures 10 x 6 x 9 mm. .       OVARIES AND ADNEXA:     RIGHT:   Measures 3.0 x 1.8 x 1.9 cm. Small subcentimeter physiologic follicles.    LEFT:   Measures 4.1 x 3.2 x 3.8 cm.  Dominant follicular cyst measures 2.8 x 2.1 x 2.4 cm.     CUL-DE-SAC:   Normal.  No free fluid or mass.    OTHER: Negative.  Bladder appears normal.                 Impression   CONCLUSION:  1. Anteverted uterus with small subserosal uterine leiomyoma measuring 10 x 9 x 6 mm.  2. Normal thickness endometrium.  3. Normal bilateral ovaries.  Benign follicular cysts both ovaries..  Dominant benign follicular cyst left ovary measures 2.8 x 2.4 cm.         OBSTETRICS HISTORY:  OB History    Para Term  AB Living   0 0 0 0 0 0   SAB IAB Ectopic Multiple Live Births   0 0 0 0 0       GYNE HISTORY:  Menarche: 11 (2024  9:00 AM)  Period Cycle (Days): IRREGULAR (2024  9:00 AM)  Period Duration (Days): IRREGUALR 3-8 DAYS (2024  9:00 AM)  Period Flow: HEAVY TO NORMAL (2024  9:00 AM)  Use of Birth Control (if yes, specify type): Condoms (2024  9:00 AM)  Pap Date: 24 (2024  9:00 AM)  Pap Result Notes: PAP/HPV NEG (2024  9:00 AM)      History   Sexual Activity    Sexual activity: Yes    Partners: Male    Birth control/ protection: Condom       MEDICAL HISTORY:  Past Medical History:    Anxiety    Headache       SOCIAL HISTORY:  Social History     Socioeconomic History    Marital status: Single     Spouse name: Not on file    Number of children: Not on file    Years of education: Not on file    Highest education level: Not on file   Occupational History    Not on file   Tobacco Use    Smoking status: Never    Smokeless tobacco: Never   Vaping Use    Vaping status: Never Used    Substance and Sexual Activity    Alcohol use: Yes     Comment: occ    Drug use: Never    Sexual activity: Yes     Partners: Male     Birth control/protection: Condom   Other Topics Concern    Not on file   Social History Narrative    Not on file     Social Drivers of Health     Financial Resource Strain: Not on file   Food Insecurity: Not on file   Transportation Needs: Not on file   Physical Activity: Not on file   Stress: Not on file   Social Connections: Not on file   Housing Stability: Not on file       MEDICATIONS:    Current Outpatient Medications:     Multiple Vitamins-Minerals (WOMENS MULTI VITAMIN & MINERAL) Oral Tab, Take by mouth., Disp: , Rfl:     Rizatriptan Benzoate 10 MG Oral Tab, Take 1 tablet (10 mg total) by mouth as needed for Migraine (May repeat in 2 hours if headche not improved x 2. Max dose 30 mg/24 hours)., Disp: 15 tablet, Rfl: 0    ALLERGIES:  Allergies[1]      Review of Systems:  Constitutional:  Denies fatigue, night sweats, hot flashes  Cardiovascular:  denies chest pain or palpitations  Respiratory:  denies shortness of breath  Gastrointestinal:  denies heartburn, abdominal pain, diarrhea or constipation  Genitourinary:  denies dysuria, incontinence, abnormal vaginal discharge, vaginal itching see HPI  Musculoskeletal:  denies back pain.  Skin/Breast:  Denies any breast pain, lumps, or discharge.   Neurological:  denies headaches, extremity weakness or numbness.  Psychiatric: denies depression or anxiety.  Endocrine:   denies excessive thirst or urination.  Heme/Lymph:  denies history of anemia, easy bruising or bleeding.      PHYSICAL EXAM:     Vitals:    11/11/24 0902   BP: 111/72   Pulse: 105   Weight: 110 lb 12.8 oz (50.3 kg)     Body mass index is 20.27 kg/m².     Patient offered chaperone, patient declined    Constitutional: well developed, well nourished    Psychiatric:  Oriented to time, place, person and situation. Appropriate mood and affect    Pelvic Exam:  External  Genitalia: normal appearance, hair distribution, and no lesions  Urethral Meatus:  normal in size, location, without lesions and prolapse  Bladder:  No fullness, masses or tenderness  Vagina:  Normal appearance without lesions, white abnormal discharge  Cervix:  Normal without tenderness on motion  Uterus: normal in size, contour, position, mobility, without tenderness  Adnexa: normal without masses or tenderness  Perineum: normal  Anus: no hemorroids   Lymph node: no inguinal lymph nodes    Assessment & Plan:  Viridiana was seen today for consult.    Diagnoses and all orders for this visit:    Uterine leiomyoma, unspecified location    Irregular periods  -     Pregnancy Test, Urine; Future    Urinary frequency  -     Urinalysis, Routine; Future  -     Urine Culture, Routine; Future    Vaginal discharge  -     Vaginitis Vaginosis PCR Panel; Future  -     Chlamydia/Gc Amplification; Future  -     Chlamydia/Gc Amplification  -     Vaginitis Vaginosis PCR Panel    Reviewed ultrasound with patient. Discussed fibroid. The size of her fibroid is [id number]. At this size would not anticipate to cause her symptoms. Will continue to monitor.    Declines restarting ocps. Reviewed to see how cycles are for 3-6 months off ocps - if >35 days consistently and not pregnant - follow up in office and consider additional evaluation.  Reviewed reasons for irregular periods.  UA and urine culture ordered, pregnancy test ordered  Vaginal culture done today  Reports some dryness with intercourse - if culture negative, can use water based lubricant. No evidence of pelvic floor tension on exam.  All questions answered      CARTER Goodwin    This note was prepared using Dragon Medical voice recognition dictation software. As a result errors may occur. When identified these errors have been corrected. While every attempt is made to correct errors during dictation discrepancies may still exist.         [1] No Known Allergies

## 2024-11-12 LAB
C TRACH DNA SPEC QL NAA+PROBE: NEGATIVE
N GONORRHOEA DNA SPEC QL NAA+PROBE: NEGATIVE

## 2025-02-17 ENCOUNTER — NURSE TRIAGE (OUTPATIENT)
Dept: FAMILY MEDICINE CLINIC | Facility: CLINIC | Age: 25
End: 2025-02-17

## 2025-02-17 NOTE — TELEPHONE ENCOUNTER
Patient transferred to triage appointment.   Patient has appointment she scheduled online 5/22/25;  wants to swtitch to Dr. Cleary.    Has hx of bad headaches 4 x a week. Whole head hurts. Feels squish sides of head.   Usually takes ibuprofen to help pain.   Rizatriptan doesn't help. Has to take 2 hours and would ease the pain.    Stomach pains on/off;  when she gets it, its constant. Pain in \"middle of stomach\".   No vomiting.     Denied pain today.     Sooner appointment made. 4/26/25.   I offered appointment this week, however patient has conflict with work, and seeing late evening appointment or Saturday.     Patient will call back if any new or worsening symptoms.     Reason for Disposition   Headache is a chronic symptom (recurrent or ongoing AND lasting > 4 weeks)    Protocols used: Headache-A-OH

## 2025-04-12 ENCOUNTER — OFFICE VISIT (OUTPATIENT)
Dept: FAMILY MEDICINE CLINIC | Facility: CLINIC | Age: 25
End: 2025-04-12
Payer: COMMERCIAL

## 2025-04-12 VITALS
SYSTOLIC BLOOD PRESSURE: 110 MMHG | HEART RATE: 108 BPM | TEMPERATURE: 99 F | OXYGEN SATURATION: 99 % | HEIGHT: 62 IN | BODY MASS INDEX: 19.88 KG/M2 | WEIGHT: 108 LBS | DIASTOLIC BLOOD PRESSURE: 82 MMHG

## 2025-04-12 DIAGNOSIS — E61.1 IRON DEFICIENCY: ICD-10-CM

## 2025-04-12 DIAGNOSIS — R51.9 FREQUENT HEADACHES: Primary | ICD-10-CM

## 2025-04-12 DIAGNOSIS — Z31.41 FERTILITY TESTING: ICD-10-CM

## 2025-04-12 DIAGNOSIS — L65.9 FALLING HAIR: ICD-10-CM

## 2025-04-12 PROCEDURE — 3008F BODY MASS INDEX DOCD: CPT | Performed by: STUDENT IN AN ORGANIZED HEALTH CARE EDUCATION/TRAINING PROGRAM

## 2025-04-12 PROCEDURE — 99214 OFFICE O/P EST MOD 30 MIN: CPT | Performed by: STUDENT IN AN ORGANIZED HEALTH CARE EDUCATION/TRAINING PROGRAM

## 2025-04-12 PROCEDURE — 3074F SYST BP LT 130 MM HG: CPT | Performed by: STUDENT IN AN ORGANIZED HEALTH CARE EDUCATION/TRAINING PROGRAM

## 2025-04-12 PROCEDURE — 3079F DIAST BP 80-89 MM HG: CPT | Performed by: STUDENT IN AN ORGANIZED HEALTH CARE EDUCATION/TRAINING PROGRAM

## 2025-04-12 NOTE — PROGRESS NOTES
HPI:    Patient ID: Viridiana Olmstead is a 24 year old female.    HPI  Pt presenting with headaches.    Frequent headaches since HS  Temporal radiating to back  Better with applied pressure  New for the last year  Episodic flares  Can be triggered by stress but can happen spontaneously  No known weather or menstrual changes  Rizatriptan without relief  Associated nausea, dizziness  Denies vision changes, tingling  Adequate hydration  Increased thirst in AM, at bedtime  Ibuprofen with inconsistent relief  Sleeps well    Ongoing fatigue  H/o iron deficiency  Notes hair fall  H/o irregular menses  H/o fibroids  Prior birth control, discontinued Sept 2024    Getting  this year  Interested in fertility screening    Notes frequent illnesses  Frequent Tylenol/Ibuprofen     New episodes of stomach pain for last few months  Periumbilical sharp/squeezing stomach pain  Worse with sitting upright, better with lying supine  Every other month  Not related to menses  Spontaneous  No food triggers  No change with BM  Occasional heartburn: chest pressure  Enjoys spicy  Random nausea  Bloating  Limited dairy  No issues with pasta, bread  No blood in stool  Recent throat clearing    Frequent urination/urgency    Review of Systems   A comprehensive 10 point review of systems was completed.  Pertinent positives and negatives noted in the the HPI.     Current Medications[1]  Allergies:Allergies[2]   Vitals:    04/12/25 0930   BP: 110/82   Pulse: 108   Temp: 99.4 °F (37.4 °C)   SpO2: 99%   Weight: 108 lb (49 kg)   Height: 5' 2\" (1.575 m)       Body mass index is 19.75 kg/m².   PHYSICAL EXAM:   Physical Exam  Vitals reviewed.   Constitutional:       General: She is not in acute distress.     Appearance: Normal appearance. She is well-developed.   HENT:      Head: Normocephalic and atraumatic.      Right Ear: External ear normal.      Left Ear: External ear normal.   Eyes:      Conjunctiva/sclera: Conjunctivae normal.   Neck:       Thyroid: No thyroid mass or thyroid tenderness.   Cardiovascular:      Rate and Rhythm: Normal rate and regular rhythm.      Pulses: Normal pulses.      Heart sounds: Normal heart sounds, S1 normal and S2 normal. No murmur heard.  Pulmonary:      Effort: Pulmonary effort is normal. No respiratory distress.      Breath sounds: Normal breath sounds. No wheezing, rhonchi or rales.   Abdominal:      General: Bowel sounds are normal.      Palpations: Abdomen is soft.      Tenderness: There is no abdominal tenderness. There is no guarding or rebound.   Musculoskeletal:      Cervical back: Normal range of motion and neck supple. No muscular tenderness.      Right lower leg: No edema.      Left lower leg: No edema.   Lymphadenopathy:      Cervical: No cervical adenopathy.   Skin:     General: Skin is warm and dry.      Coloration: Skin is not jaundiced.   Neurological:      General: No focal deficit present.      Mental Status: She is alert and oriented to person, place, and time. Mental status is at baseline.   Psychiatric:         Attention and Perception: Attention normal.         Mood and Affect: Mood normal.         Behavior: Behavior normal. Behavior is cooperative.         Cognition and Memory: Cognition normal.             ASSESSMENT/PLAN:   1. Frequent headaches  Discussed DDx including iron deficiency, metabolic, etc  Will check labs  - increase hydration and rest as tolerated  - discussed role of magnesium, B vitamin supplementation  - discussed red flags for urgent reevaluation  - TSH W Reflex To Free T4; Future  - Comp Metabolic Panel (14); Future  - Hemoglobin A1C; Future  - Lipid Panel; Future  - CBC With Differential With Platelet; Future    2. Iron deficiency  - Ferritin; Future  - Iron And Tibc [E]; Future    3. Falling hair  Discussed DDx  Will check labs  - Vitamin D; Future    4. Fertility testing  Will check labs  Continue to monitor  - Testosterone,Total and Weakly Bound w/ SHBG; Future  -  Anti-Mullerian Hormone; Future  - Dehydroepiandrosterone Sulfate [E]; Future    Pt verbalized understanding and agrees with plan.    Orders Placed This Encounter   Procedures    TSH W Reflex To Free T4    Comp Metabolic Panel (14)    Hemoglobin A1C    Lipid Panel    Vitamin D    CBC With Differential With Platelet    Ferritin    Iron And Tibc [E]    Testosterone,Total and Weakly Bound w/ SHBG    Anti-Mullerian Hormone    Dehydroepiandrosterone Sulfate [E]       Meds This Visit:  Requested Prescriptions      No prescriptions requested or ordered in this encounter       Imaging & Referrals:  None         ID#[id number]         [1]   Current Outpatient Medications   Medication Sig Dispense Refill    Rizatriptan Benzoate 10 MG Oral Tab Take 1 tablet (10 mg total) by mouth as needed for Migraine (May repeat in 2 hours if headche not improved x 2. Max dose 30 mg/24 hours). 15 tablet 0    ergocalciferol 1.25 MG (23620 UT) Oral Cap Take 1 capsule (50,000 Units total) by mouth once a week. 24 capsule 0   [2] No Known Allergies

## 2025-04-12 NOTE — PATIENT INSTRUCTIONS
Hair fall:  Awaiting vit D, iron    Headaches:  Anticipate iron infusion, monitor response    Stomach:  Fiber gummies vs dietary fiber    Urinating:  Pelvic floor exercises

## 2025-04-14 ENCOUNTER — TELEPHONE (OUTPATIENT)
Dept: FAMILY MEDICINE CLINIC | Facility: CLINIC | Age: 25
End: 2025-04-14

## 2025-04-14 ENCOUNTER — LAB ENCOUNTER (OUTPATIENT)
Dept: LAB | Facility: HOSPITAL | Age: 25
End: 2025-04-14
Attending: STUDENT IN AN ORGANIZED HEALTH CARE EDUCATION/TRAINING PROGRAM
Payer: COMMERCIAL

## 2025-04-14 DIAGNOSIS — Z31.41 FERTILITY TESTING: ICD-10-CM

## 2025-04-14 DIAGNOSIS — R51.9 FREQUENT HEADACHES: ICD-10-CM

## 2025-04-14 DIAGNOSIS — L65.9 FALLING HAIR: ICD-10-CM

## 2025-04-14 DIAGNOSIS — E61.1 IRON DEFICIENCY: ICD-10-CM

## 2025-04-14 LAB
ALBUMIN SERPL-MCNC: 4.8 G/DL (ref 3.2–4.8)
ALBUMIN/GLOB SERPL: 2 {RATIO} (ref 1–2)
ALP LIVER SERPL-CCNC: 75 U/L (ref 37–98)
ALT SERPL-CCNC: 8 U/L (ref 10–49)
ANION GAP SERPL CALC-SCNC: 9 MMOL/L (ref 0–18)
AST SERPL-CCNC: 16 U/L (ref ?–34)
BASOPHILS # BLD AUTO: 0.03 X10(3) UL (ref 0–0.2)
BASOPHILS NFR BLD AUTO: 0.6 %
BILIRUB SERPL-MCNC: 0.8 MG/DL (ref 0.3–1.2)
BUN BLD-MCNC: 9 MG/DL (ref 9–23)
BUN/CREAT SERPL: 13 (ref 10–20)
CALCIUM BLD-MCNC: 9 MG/DL (ref 8.7–10.4)
CHLORIDE SERPL-SCNC: 106 MMOL/L (ref 98–112)
CHOLEST SERPL-MCNC: 182 MG/DL (ref ?–200)
CO2 SERPL-SCNC: 26 MMOL/L (ref 21–32)
CREAT BLD-MCNC: 0.69 MG/DL (ref 0.55–1.02)
DEPRECATED HBV CORE AB SER IA-ACNC: 11 NG/ML (ref 50–306)
DEPRECATED RDW RBC AUTO: 41.5 FL (ref 35.1–46.3)
DHEA-S SERPL-MCNC: 211.3 UG/DL (ref 25.9–460.2)
EGFRCR SERPLBLD CKD-EPI 2021: 123 ML/MIN/1.73M2 (ref 60–?)
EOSINOPHIL # BLD AUTO: 0.07 X10(3) UL (ref 0–0.7)
EOSINOPHIL NFR BLD AUTO: 1.5 %
ERYTHROCYTE [DISTWIDTH] IN BLOOD BY AUTOMATED COUNT: 12.7 % (ref 11–15)
EST. AVERAGE GLUCOSE BLD GHB EST-MCNC: 103 MG/DL (ref 68–126)
FASTING PATIENT LIPID ANSWER: YES
FASTING STATUS PATIENT QL REPORTED: YES
GLOBULIN PLAS-MCNC: 2.4 G/DL (ref 2–3.5)
GLUCOSE BLD-MCNC: 91 MG/DL (ref 70–99)
HBA1C MFR BLD: 5.2 % (ref ?–5.7)
HCT VFR BLD AUTO: 41.2 % (ref 35–48)
HDLC SERPL-MCNC: 43 MG/DL (ref 40–59)
HGB BLD-MCNC: 13.5 G/DL (ref 12–16)
IMM GRANULOCYTES # BLD AUTO: 0.02 X10(3) UL (ref 0–1)
IMM GRANULOCYTES NFR BLD: 0.4 %
IRON SATN MFR SERPL: 36 % (ref 15–50)
IRON SERPL-MCNC: 136 UG/DL (ref 50–170)
LDLC SERPL CALC-MCNC: 118 MG/DL (ref ?–100)
LYMPHOCYTES # BLD AUTO: 2.19 X10(3) UL (ref 1–4)
LYMPHOCYTES NFR BLD AUTO: 46.7 %
MCH RBC QN AUTO: 29.5 PG (ref 26–34)
MCHC RBC AUTO-ENTMCNC: 32.8 G/DL (ref 31–37)
MCV RBC AUTO: 90.2 FL (ref 80–100)
MONOCYTES # BLD AUTO: 0.37 X10(3) UL (ref 0.1–1)
MONOCYTES NFR BLD AUTO: 7.9 %
NEUTROPHILS # BLD AUTO: 2.01 X10 (3) UL (ref 1.5–7.7)
NEUTROPHILS # BLD AUTO: 2.01 X10(3) UL (ref 1.5–7.7)
NEUTROPHILS NFR BLD AUTO: 42.9 %
NONHDLC SERPL-MCNC: 139 MG/DL (ref ?–130)
OSMOLALITY SERPL CALC.SUM OF ELEC: 290 MOSM/KG (ref 275–295)
PLATELET # BLD AUTO: 292 10(3)UL (ref 150–450)
POTASSIUM SERPL-SCNC: 3.8 MMOL/L (ref 3.5–5.1)
PROT SERPL-MCNC: 7.2 G/DL (ref 5.7–8.2)
RBC # BLD AUTO: 4.57 X10(6)UL (ref 3.8–5.3)
SODIUM SERPL-SCNC: 141 MMOL/L (ref 136–145)
TOTAL IRON BINDING CAPACITY: 381 UG/DL (ref 250–425)
TRANSFERRIN SERPL-MCNC: 296 MG/DL (ref 250–380)
TRIGL SERPL-MCNC: 118 MG/DL (ref 30–149)
TSI SER-ACNC: 1.37 UIU/ML (ref 0.55–4.78)
VIT D+METAB SERPL-MCNC: 26 NG/ML (ref 30–100)
VLDLC SERPL CALC-MCNC: 21 MG/DL (ref 0–30)
WBC # BLD AUTO: 4.7 X10(3) UL (ref 4–11)

## 2025-04-14 PROCEDURE — 85025 COMPLETE CBC W/AUTO DIFF WBC: CPT

## 2025-04-14 PROCEDURE — 83520 IMMUNOASSAY QUANT NOS NONAB: CPT

## 2025-04-14 PROCEDURE — 83540 ASSAY OF IRON: CPT

## 2025-04-14 PROCEDURE — 83036 HEMOGLOBIN GLYCOSYLATED A1C: CPT

## 2025-04-14 PROCEDURE — 82627 DEHYDROEPIANDROSTERONE: CPT

## 2025-04-14 PROCEDURE — 36415 COLL VENOUS BLD VENIPUNCTURE: CPT

## 2025-04-14 PROCEDURE — 84410 TESTOSTERONE BIOAVAILABLE: CPT

## 2025-04-14 PROCEDURE — 84466 ASSAY OF TRANSFERRIN: CPT

## 2025-04-14 PROCEDURE — 80061 LIPID PANEL: CPT

## 2025-04-14 PROCEDURE — 82306 VITAMIN D 25 HYDROXY: CPT

## 2025-04-14 PROCEDURE — 82728 ASSAY OF FERRITIN: CPT

## 2025-04-14 PROCEDURE — 80053 COMPREHEN METABOLIC PANEL: CPT

## 2025-04-14 PROCEDURE — 84443 ASSAY THYROID STIM HORMONE: CPT

## 2025-04-15 DIAGNOSIS — R51.9 FREQUENT HEADACHES: Primary | ICD-10-CM

## 2025-04-15 PROBLEM — E61.1 IRON DEFICIENCY: Status: ACTIVE | Noted: 2025-04-15

## 2025-04-18 LAB
MULLERIAN AMH: 4.88 NG/ML
SEX HORM BIND GLOB: 33.8 NMOL/L
TESTOST % FREE+WEAK BND: 25.1 %
TESTOST FREE+WEAK BND: 5.7 NG/DL
TESTOSTERONE TOT /MS: 22.9 NG/DL

## 2025-04-21 ENCOUNTER — TELEPHONE (OUTPATIENT)
Age: 25
End: 2025-04-21

## 2025-05-02 ENCOUNTER — OFFICE VISIT (OUTPATIENT)
Age: 25
End: 2025-05-02
Attending: STUDENT IN AN ORGANIZED HEALTH CARE EDUCATION/TRAINING PROGRAM
Payer: COMMERCIAL

## 2025-05-02 VITALS
TEMPERATURE: 98 F | WEIGHT: 109 LBS | RESPIRATION RATE: 16 BRPM | OXYGEN SATURATION: 98 % | HEART RATE: 85 BPM | BODY MASS INDEX: 20 KG/M2 | DIASTOLIC BLOOD PRESSURE: 64 MMHG | SYSTOLIC BLOOD PRESSURE: 98 MMHG

## 2025-05-02 DIAGNOSIS — E61.1 IRON DEFICIENCY: Primary | ICD-10-CM

## 2025-05-02 DIAGNOSIS — R51.9 FREQUENT HEADACHES: ICD-10-CM

## 2025-05-02 NOTE — PROGRESS NOTES
Viridiana arrives to clinic for Venofer 200 1/2 . Pt denies any issues or concerns.      Ordering Provider: MD Evin  Order Exp: 1 remains in order set      Pt tolerated infusion without difficulty or complaint. No s/s of reaction. EOT VSS. Reviewed next apt date/time: 5/16 4pm      Education Record  Learner:  Patient  Disease / Diagnosis: YANIRA  Barriers / Limitations:  None  Method:  Brief focused and Discussion  General Topics:  Medication, Precautions, Side effects and symptom management, and Plan of care reviewed  Outcome:  Shows understanding

## 2025-05-16 ENCOUNTER — OFFICE VISIT (OUTPATIENT)
Age: 25
End: 2025-05-16
Attending: STUDENT IN AN ORGANIZED HEALTH CARE EDUCATION/TRAINING PROGRAM
Payer: COMMERCIAL

## 2025-05-16 VITALS
TEMPERATURE: 99 F | OXYGEN SATURATION: 98 % | DIASTOLIC BLOOD PRESSURE: 74 MMHG | BODY MASS INDEX: 20 KG/M2 | SYSTOLIC BLOOD PRESSURE: 112 MMHG | RESPIRATION RATE: 16 BRPM | HEART RATE: 93 BPM | WEIGHT: 108.63 LBS

## 2025-05-16 DIAGNOSIS — R51.9 FREQUENT HEADACHES: ICD-10-CM

## 2025-05-16 DIAGNOSIS — E61.1 IRON DEFICIENCY: Primary | ICD-10-CM

## 2025-05-16 NOTE — PROGRESS NOTES
Patient arrives to infusion for Venofer. Patient denies any issues or concerns.      Ordering Provider: Viridiana Cleary MD  Order Exp: after this dose     Patient appeared to tolerate infusion without difficulty or complaint. No s/s of adverse reaction noted.     Reviewed next apt date/time: pt to follow up with ordering provider, pt verbalized understanding.     Education Record  Learner:  Patient  Disease / Diagnosis: Iron deficiency  Barriers / Limitations:  None  Method:  Discussion and Reinforcement  General Topics:  Medication, Side effects and symptom management, and Plan of care reviewed  Outcome:  Shows understanding

## 2025-06-05 ENCOUNTER — PATIENT MESSAGE (OUTPATIENT)
Dept: FAMILY MEDICINE CLINIC | Facility: CLINIC | Age: 25
End: 2025-06-05

## 2025-06-05 DIAGNOSIS — E61.1 IRON DEFICIENCY: Primary | ICD-10-CM

## 2025-06-10 NOTE — TELEPHONE ENCOUNTER
Routed to Dr Cleary for advise, thanks.      Future Appointments   Date Time Provider Department Center   7/26/2025  8:45 AM Viridiana Cleary MD [facility]

## 2025-06-13 ENCOUNTER — LAB ENCOUNTER (OUTPATIENT)
Dept: LAB | Facility: HOSPITAL | Age: 25
End: 2025-06-13
Attending: STUDENT IN AN ORGANIZED HEALTH CARE EDUCATION/TRAINING PROGRAM
Payer: COMMERCIAL

## 2025-06-13 DIAGNOSIS — E61.1 IRON DEFICIENCY: ICD-10-CM

## 2025-06-13 LAB — DEPRECATED HBV CORE AB SER IA-ACNC: 89 NG/ML (ref 50–306)

## 2025-06-13 PROCEDURE — 36415 COLL VENOUS BLD VENIPUNCTURE: CPT

## 2025-06-13 PROCEDURE — 82728 ASSAY OF FERRITIN: CPT

## 2025-07-24 ENCOUNTER — PATIENT MESSAGE (OUTPATIENT)
Dept: FAMILY MEDICINE CLINIC | Facility: CLINIC | Age: 25
End: 2025-07-24

## 2025-07-24 DIAGNOSIS — L81.9 ABNORMAL PIGMENTATION OF SKIN: Primary | ICD-10-CM

## (undated) NOTE — LETTER
Date: 4/2/2024    Patient Name: Viridiana Olmstead          To Whom it may concern:    This letter has been written at the patient's request. The above patient was seen at [facility] for treatment of a medical condition.    This patient should be excused from attending work/school from 4/1/24 through 4/3/24.    The patient may return to work/school when fever free 24 hours without fever reducing medication with the following limitations none.        Sincerely,      CARTER Thomas, BRYANP-C  [facility]  04/02/24  7:21 AM

## (undated) NOTE — LETTER
2/5/2024          To Whom It May Concern:    Viridiana Olmstead is currently under my medical care and may not return to work at this time.    Please excuse Viridiana for 2 days.  She may return to work on 2/7/24 pending COVID-19/influenza/RSV testing   Activity is restricted as follows: none.    If you require additional information please contact our office.        Sincerely,      CARTER Zhou          Document generated by:  CARTER Zhou

## (undated) NOTE — LETTER
4/26/2023          To Whom It May Concern:    Kannan Bullard is currently under my medical care and may not return to work at this time. Please excuse Anna Coyne for 2 days. She may return to work on 4/28/23. Activity is restricted as follows: none. If you require additional information please contact our office.         Sincerely,      CARTER Martin          Document generated by:  CARTER Martin

## (undated) NOTE — LETTER
Date: 10/4/2023    Patient Name: Darvin Garcia          To Whom it may concern:    Darvin Garcia was seen in my clinic today. She may return to work 10/6/23 as long as she is fever free for 24 hours without the use of fever reducing medications.             Sincerely,    Vamshi Hirsch, APRN